# Patient Record
Sex: FEMALE | Race: WHITE
[De-identification: names, ages, dates, MRNs, and addresses within clinical notes are randomized per-mention and may not be internally consistent; named-entity substitution may affect disease eponyms.]

---

## 2018-08-28 PROBLEM — Z00.00 ENCOUNTER FOR PREVENTIVE HEALTH EXAMINATION: Status: ACTIVE | Noted: 2018-08-28

## 2018-08-29 ENCOUNTER — APPOINTMENT (OUTPATIENT)
Dept: GASTROENTEROLOGY | Facility: CLINIC | Age: 23
End: 2018-08-29
Payer: COMMERCIAL

## 2018-08-29 ENCOUNTER — LABORATORY RESULT (OUTPATIENT)
Age: 23
End: 2018-08-29

## 2018-08-29 VITALS
WEIGHT: 109 LBS | SYSTOLIC BLOOD PRESSURE: 100 MMHG | BODY MASS INDEX: 19.31 KG/M2 | HEART RATE: 84 BPM | TEMPERATURE: 98.2 F | HEIGHT: 63 IN | OXYGEN SATURATION: 98 % | RESPIRATION RATE: 14 BRPM | DIASTOLIC BLOOD PRESSURE: 68 MMHG

## 2018-08-29 PROCEDURE — 99204 OFFICE O/P NEW MOD 45 MIN: CPT | Mod: 25

## 2018-08-29 PROCEDURE — 36415 COLL VENOUS BLD VENIPUNCTURE: CPT

## 2018-08-29 RX ORDER — ETONOGESTREL AND ETHINYL ESTRADIOL .12; .015 MG/D; MG/D
0.12-0.015 INSERT, EXTENDED RELEASE VAGINAL
Qty: 1 | Refills: 0 | Status: ACTIVE | COMMUNITY
Start: 2018-08-19

## 2018-09-06 LAB
ANION GAP SERPL CALC-SCNC: 16 MMOL/L
BASOPHILS # BLD AUTO: 0.1 K/UL
BASOPHILS NFR BLD AUTO: 0.8 %
BUN SERPL-MCNC: 12 MG/DL
CALCIUM SERPL-MCNC: 9.7 MG/DL
CHLORIDE SERPL-SCNC: 102 MMOL/L
CO2 SERPL-SCNC: 23 MMOL/L
CREAT SERPL-MCNC: 0.7 MG/DL
CRP SERPL-MCNC: 0.72 MG/DL
EOSINOPHIL # BLD AUTO: 0.1 K/UL
EOSINOPHIL NFR BLD AUTO: 0.8 %
FERRITIN SERPL-MCNC: 108 NG/ML
GLUCOSE SERPL-MCNC: 101 MG/DL
HBV SURFACE AB SERPL IA-ACNC: <3 MIU/ML
HBV SURFACE AG SER QL: NONREACTIVE
HCT VFR BLD CALC: 37.1 %
HCV RNA FLD QL NAA+PROBE: NORMAL
HCV RNA SPEC QL PROBE+SIG AMP: NOT DETECTED
HGB BLD-MCNC: 12.4 G/DL
IRON SATN MFR SERPL: 56 %
IRON SERPL-MCNC: 152 UG/DL
LYMPHOCYTES # BLD AUTO: 3.33 K/UL
LYMPHOCYTES NFR BLD AUTO: 25.8 %
M TB IFN-G BLD-IMP: NEGATIVE
MAN DIFF?: NORMAL
MCHC RBC-ENTMCNC: 31.6 PG
MCHC RBC-ENTMCNC: 33.4 GM/DL
MCV RBC AUTO: 94.4 FL
MEV IGG FLD QL IA: 129 AU/ML
MEV IGG+IGM SER-IMP: POSITIVE
MONOCYTES # BLD AUTO: 0.79 K/UL
MONOCYTES NFR BLD AUTO: 6.1 %
MUV AB SER-ACNC: POSITIVE
MUV IGG SER QL IA: 57.7 AU/ML
NEUTROPHILS # BLD AUTO: 8.59 K/UL
NEUTROPHILS NFR BLD AUTO: 56.7 %
PLATELET # BLD AUTO: 395 K/UL
POTASSIUM SERPL-SCNC: 3.7 MMOL/L
QUANTIFERON TB PLUS MITOGEN MINUS NIL: >10 IU/ML
QUANTIFERON TB PLUS NIL: 0.03 IU/ML
QUANTIFERON TB PLUS TB1 MINUS NIL: 0 IU/ML
QUANTIFERON TB PLUS TB2 MINUS NIL: 0 IU/ML
RBC # BLD: 3.93 M/UL
RBC # FLD: 13.5 %
RUBV IGG FLD-ACNC: 1.4 INDEX
RUBV IGG SER-IMP: POSITIVE
SODIUM SERPL-SCNC: 141 MMOL/L
TIBC SERPL-MCNC: 270 UG/DL
UIBC SERPL-MCNC: 118 UG/DL
VZV AB TITR SER: POSITIVE
VZV IGG SER IF-ACNC: 409.1 INDEX
WBC # FLD AUTO: 12.92 K/UL

## 2018-10-11 ENCOUNTER — APPOINTMENT (OUTPATIENT)
Dept: GASTROENTEROLOGY | Facility: HOSPITAL | Age: 23
End: 2018-10-11

## 2018-10-11 ENCOUNTER — OUTPATIENT (OUTPATIENT)
Dept: OUTPATIENT SERVICES | Facility: HOSPITAL | Age: 23
LOS: 1 days | Discharge: HOME | End: 2018-10-11
Payer: COMMERCIAL

## 2018-10-11 ENCOUNTER — RESULT REVIEW (OUTPATIENT)
Age: 23
End: 2018-10-11

## 2018-10-11 PROCEDURE — 88305 TISSUE EXAM BY PATHOLOGIST: CPT

## 2018-10-11 PROCEDURE — 45380 COLONOSCOPY AND BIOPSY: CPT

## 2018-10-11 PROCEDURE — 45380 COLONOSCOPY AND BIOPSY: CPT | Mod: GC

## 2018-10-12 LAB — SURGICAL PATHOLOGY STUDY: SIGNIFICANT CHANGE UP

## 2018-10-25 ENCOUNTER — OTHER (OUTPATIENT)
Age: 23
End: 2018-10-25

## 2018-10-25 ENCOUNTER — RX RENEWAL (OUTPATIENT)
Age: 23
End: 2018-10-25

## 2018-10-25 DIAGNOSIS — A49.8 OTHER BACTERIAL INFECTIONS OF UNSPECIFIED SITE: ICD-10-CM

## 2018-10-26 ENCOUNTER — RX RENEWAL (OUTPATIENT)
Age: 23
End: 2018-10-26

## 2018-10-29 ENCOUNTER — RX RENEWAL (OUTPATIENT)
Age: 23
End: 2018-10-29

## 2018-10-31 ENCOUNTER — APPOINTMENT (OUTPATIENT)
Dept: GASTROENTEROLOGY | Facility: CLINIC | Age: 23
End: 2018-10-31
Payer: COMMERCIAL

## 2018-10-31 VITALS
TEMPERATURE: 99 F | HEART RATE: 86 BPM | OXYGEN SATURATION: 99 % | WEIGHT: 118 LBS | RESPIRATION RATE: 14 BRPM | BODY MASS INDEX: 20.91 KG/M2 | SYSTOLIC BLOOD PRESSURE: 110 MMHG | DIASTOLIC BLOOD PRESSURE: 80 MMHG | HEIGHT: 63 IN

## 2018-10-31 PROCEDURE — 36415 COLL VENOUS BLD VENIPUNCTURE: CPT | Mod: GC

## 2018-10-31 PROCEDURE — 99214 OFFICE O/P EST MOD 30 MIN: CPT | Mod: GC,25

## 2018-10-31 RX ORDER — METRONIDAZOLE 500 MG/1
500 TABLET ORAL
Qty: 21 | Refills: 0 | Status: DISCONTINUED | COMMUNITY
Start: 2018-08-05 | End: 2018-10-31

## 2018-11-01 LAB
IGA SER QL IEP: 160 MG/DL
TTG IGA SER IA-ACNC: <5 UNITS
TTG IGA SER-ACNC: NEGATIVE
TTG IGG SER IA-ACNC: <5 UNITS
TTG IGG SER IA-ACNC: NEGATIVE

## 2018-11-16 ENCOUNTER — OTHER (OUTPATIENT)
Age: 23
End: 2018-11-16

## 2018-11-19 ENCOUNTER — RX RENEWAL (OUTPATIENT)
Age: 23
End: 2018-11-19

## 2018-11-26 ENCOUNTER — RX RENEWAL (OUTPATIENT)
Age: 23
End: 2018-11-26

## 2018-11-26 RX ORDER — VANCOMYCIN HYDROCHLORIDE 125 MG/1
125 CAPSULE ORAL 4 TIMES DAILY
Qty: 70 | Refills: 0 | Status: DISCONTINUED | COMMUNITY
Start: 2018-10-26 | End: 2018-11-26

## 2018-11-26 RX ORDER — VANCOMYCIN HYDROCHLORIDE 125 MG/1
125 CAPSULE ORAL
Qty: 40 | Refills: 0 | Status: DISCONTINUED | COMMUNITY
Start: 2018-08-08 | End: 2018-11-26

## 2018-12-05 ENCOUNTER — MOBILE ON CALL (OUTPATIENT)
Age: 23
End: 2018-12-05

## 2018-12-13 ENCOUNTER — OTHER (OUTPATIENT)
Age: 23
End: 2018-12-13

## 2018-12-14 ENCOUNTER — RX RENEWAL (OUTPATIENT)
Age: 23
End: 2018-12-14

## 2018-12-20 ENCOUNTER — RX RENEWAL (OUTPATIENT)
Age: 23
End: 2018-12-20

## 2019-01-02 ENCOUNTER — APPOINTMENT (OUTPATIENT)
Dept: GASTROENTEROLOGY | Facility: CLINIC | Age: 24
End: 2019-01-02
Payer: COMMERCIAL

## 2019-01-02 VITALS
HEART RATE: 85 BPM | RESPIRATION RATE: 14 BRPM | TEMPERATURE: 97.7 F | DIASTOLIC BLOOD PRESSURE: 70 MMHG | OXYGEN SATURATION: 99 % | WEIGHT: 118 LBS | SYSTOLIC BLOOD PRESSURE: 120 MMHG

## 2019-01-02 PROCEDURE — 36415 COLL VENOUS BLD VENIPUNCTURE: CPT

## 2019-01-02 PROCEDURE — 99215 OFFICE O/P EST HI 40 MIN: CPT | Mod: 25

## 2019-01-02 RX ORDER — FIDAXOMICIN 200 MG/1
200 TABLET, FILM COATED ORAL
Qty: 20 | Refills: 0 | Status: DISCONTINUED | COMMUNITY
Start: 2018-10-25 | End: 2019-01-02

## 2019-01-02 RX ORDER — PREDNISONE 20 MG/1
20 TABLET ORAL
Qty: 32 | Refills: 0 | Status: DISCONTINUED | COMMUNITY
Start: 2018-08-15 | End: 2019-01-02

## 2019-01-02 RX ORDER — NITROGLYCERIN 4 MG/G
0.4 OINTMENT RECTAL
Qty: 1 | Refills: 3 | Status: DISCONTINUED | COMMUNITY
Start: 2018-12-04 | End: 2019-01-02

## 2019-01-02 RX ORDER — PREDNISONE 10 MG/1
10 TABLET ORAL DAILY
Qty: 40 | Refills: 0 | Status: DISCONTINUED | COMMUNITY
Start: 2018-08-29 | End: 2019-01-02

## 2019-01-02 RX ORDER — MESALAMINE 4 G/60ML
4 ENEMA RECTAL
Qty: 30 | Refills: 2 | Status: DISCONTINUED | COMMUNITY
Start: 2018-10-11 | End: 2019-01-02

## 2019-01-03 LAB
25(OH)D3 SERPL-MCNC: 41.9 NG/ML
ALBUMIN SERPL ELPH-MCNC: 4.3 G/DL
ALP BLD-CCNC: 32 U/L
ALT SERPL-CCNC: 15 U/L
ANION GAP SERPL CALC-SCNC: 12 MMOL/L
AST SERPL-CCNC: 19 U/L
BASOPHILS # BLD AUTO: 0.04 K/UL
BASOPHILS NFR BLD AUTO: 0.7 %
BILIRUB SERPL-MCNC: 0.3 MG/DL
BUN SERPL-MCNC: 11 MG/DL
CALCIUM SERPL-MCNC: 9.6 MG/DL
CHLORIDE SERPL-SCNC: 102 MMOL/L
CO2 SERPL-SCNC: 26 MMOL/L
CREAT SERPL-MCNC: 0.7 MG/DL
CRP SERPL-MCNC: 0.17 MG/DL
EOSINOPHIL # BLD AUTO: 0.05 K/UL
EOSINOPHIL NFR BLD AUTO: 0.9 %
GLUCOSE SERPL-MCNC: 83 MG/DL
HCT VFR BLD CALC: 38.5 %
HGB BLD-MCNC: 12.7 G/DL
IMM GRANULOCYTES NFR BLD AUTO: 0.2 %
IRON SATN MFR SERPL: 55 %
IRON SERPL-MCNC: 178 UG/DL
LYMPHOCYTES # BLD AUTO: 2.77 K/UL
LYMPHOCYTES NFR BLD AUTO: 49.1 %
MAN DIFF?: NORMAL
MCHC RBC-ENTMCNC: 30.4 PG
MCHC RBC-ENTMCNC: 33 GM/DL
MCV RBC AUTO: 92.1 FL
MONOCYTES # BLD AUTO: 0.38 K/UL
MONOCYTES NFR BLD AUTO: 6.7 %
NEUTROPHILS # BLD AUTO: 2.39 K/UL
NEUTROPHILS NFR BLD AUTO: 42.4 %
PLATELET # BLD AUTO: 274 K/UL
POTASSIUM SERPL-SCNC: 3.5 MMOL/L
PROT SERPL-MCNC: 7.4 G/DL
RBC # BLD: 4.18 M/UL
RBC # FLD: 12 %
SODIUM SERPL-SCNC: 140 MMOL/L
TIBC SERPL-MCNC: 322 UG/DL
UIBC SERPL-MCNC: 144 UG/DL
VIT B12 SERPL-MCNC: 372 PG/ML
WBC # FLD AUTO: 5.64 K/UL

## 2019-01-11 ENCOUNTER — RX RENEWAL (OUTPATIENT)
Age: 24
End: 2019-01-11

## 2019-01-11 DIAGNOSIS — K59.00 CONSTIPATION, UNSPECIFIED: ICD-10-CM

## 2019-01-25 ENCOUNTER — RX RENEWAL (OUTPATIENT)
Age: 24
End: 2019-01-25

## 2019-01-31 ENCOUNTER — APPOINTMENT (OUTPATIENT)
Dept: GASTROENTEROLOGY | Facility: CLINIC | Age: 24
End: 2019-01-31

## 2019-02-07 ENCOUNTER — RESULT REVIEW (OUTPATIENT)
Age: 24
End: 2019-02-07

## 2019-02-07 ENCOUNTER — APPOINTMENT (OUTPATIENT)
Dept: GASTROENTEROLOGY | Facility: HOSPITAL | Age: 24
End: 2019-02-07

## 2019-02-07 ENCOUNTER — OUTPATIENT (OUTPATIENT)
Dept: OUTPATIENT SERVICES | Facility: HOSPITAL | Age: 24
LOS: 1 days | Discharge: HOME | End: 2019-02-07
Payer: COMMERCIAL

## 2019-02-07 LAB
C DIFF GDH STL QL: NEGATIVE — SIGNIFICANT CHANGE UP
C DIFF GDH STL QL: SIGNIFICANT CHANGE UP

## 2019-02-07 PROCEDURE — 45380 COLONOSCOPY AND BIOPSY: CPT

## 2019-02-07 PROCEDURE — 87449 NOS EACH ORGANISM AG IA: CPT

## 2019-02-07 PROCEDURE — 87324 CLOSTRIDIUM AG IA: CPT

## 2019-02-07 PROCEDURE — 88305 TISSUE EXAM BY PATHOLOGIST: CPT

## 2019-02-08 LAB — SURGICAL PATHOLOGY STUDY: SIGNIFICANT CHANGE UP

## 2019-02-11 ENCOUNTER — APPOINTMENT (OUTPATIENT)
Dept: GASTROENTEROLOGY | Facility: CLINIC | Age: 24
End: 2019-02-11
Payer: COMMERCIAL

## 2019-02-11 VITALS
BODY MASS INDEX: 20.73 KG/M2 | HEART RATE: 89 BPM | OXYGEN SATURATION: 99 % | WEIGHT: 117 LBS | DIASTOLIC BLOOD PRESSURE: 60 MMHG | SYSTOLIC BLOOD PRESSURE: 100 MMHG | RESPIRATION RATE: 14 BRPM | HEIGHT: 63 IN

## 2019-02-11 DIAGNOSIS — K51.90 ULCERATIVE COLITIS, UNSPECIFIED, W/OUT COMPLICATIONS: ICD-10-CM

## 2019-02-11 PROCEDURE — 99214 OFFICE O/P EST MOD 30 MIN: CPT

## 2019-02-11 NOTE — ASSESSMENT
[FreeTextEntry1] : 24 y/o F with UC, diagnosed in 8/2018 (extent was proximal TC on initial colonoscopy), complicated by recurrent C diff infection x 3, s/p therapy with vancomycin x 2, fidaxomicin x 1, with resolution of infection (C diff PCR is now negative). No response on 5ASA, currently on pred 5 mg and has ongoing symptoms of diarrhea, hematochezia and urgency. \par \par # Entensive UC\par - start VDZ: apply for prior auth. Given safety profile of VDZ over IFX, will start with the former\par A detailed discussion of the risks and benefits of biologic therapy occurred. We detailed increased risk of infections (bacterial, fungal, viral) which we will mitigate by immunizing in appropriate fashion (HBV,zoster, flu, pneumovax); risk of malignancy with time spent on lymphoma (HSTCL in young adults) and skin cancers (need for derm annual eval) ; risk of liver injury, bone marrow suppression, CNS disorders, allergic and infusion (if IV admin) reactions, idiosyncratic skin reactions, joint problems, among other rare and unusual manifestations.  We discussed the need to notify if fevers or major illness prior to infusions, and the need to maintain follow up and obtain request labs and evaluations.  In addition, we have already discusses resources such as [facility] and the manufacturers "patients" page to obtain additional detailed information on the medication.\par - RTC after two loading doses\par - Canasa suppository in the interim for urgency\par - continue pred 5 mg qd and 5ASA PO for now\par - discussed expectations, therapy efficacy, home infusions and various treatment options at length and all questions were answered\par - Vit D and Ca while on steroids\par - Celiac serologies negative \par \par #HCM\par - flu shot with PCP\par - Can discuss dexa at next visit once off steroids \par \par RTC after two loading doses with VDZ\par \par Pt seen and d/w Dr Durham\par \par Violeta Perez MD, PGY7\par Advanced IBD Fellow\par

## 2019-02-11 NOTE — CONSULT LETTER
[Dear  ___] : Dear  [unfilled], [Courtesy Letter:] : I had the pleasure of seeing your patient, [unfilled], in my office today. [Please see my note below.] : Please see my note below. [Sincerely,] : Sincerely, [FreeTextEntry3] : Ming Durham MD\par Director, IBD Program\par [facility], [facility]\par \par Associate Professor of Medicine\par [employer]\par [employer] [facility]\par

## 2019-02-11 NOTE — HISTORY OF PRESENT ILLNESS
[de-identified] : 24 y/o F with UC, diagnosed in 8/2018 (extent was proximal TC on initial colonoscopy), complicated by recurrent C diff infection x 3, s/p therapy with vancomycin x 2, fidaxomicin x 1, with resolution of infection (C diff PCR is now negative). 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 CRP is now normal. \par She continues to have bloody diarrhea and severe urgency 10-20 times/claudy, night time awakening. No EIM. Restarted pred 5 mg qd, Apriso 4 pills daily. Unable to keep in Rowasa enema. \par \par No EIMs. \par \par Initial Hx:\par 23 F with new onset symptoms of bloody diarrhea, diagnosed with UC, sent by primary GI, Dr Martinez for further management. \par Pt says 6 weeks ago, she started having several episodes of bloody diarrhea a day with abd pain and significant weight loss. She went to her pediatrician who diagnosed her with C diff (test positive per pt) and started her on flayl with no improvement. She was referred to Dr Martinez, repeat C diff test (-) and stool studies (-). She underwent flex sig 8/15/18 with showed moderate active colitis from the anal verge to the distal TC (extent of exam) with biopsies consistent with chronic active colitis. She was started on vanco PO and prednisone 40 mg, has been on latter for 2 weeks now with significant improvement and feels 95% better. She now goes 2-5 times/day, scant blood, minimal pain. No fatigue, but unable to sleep d/t pred and had acid reflux. \par \par She has had a "sensitive stomach" for as long as she can remember- foods such as daily, oily or spicy foods give her diarrhea. \par \par Endoscopy: flex sig 8/15/18 with showed moderate active colitis from the anal verge to the distal TC (extent of exam) \par Pathology: chronic active colitis\par \par SH: , two young children, no smoking/ EtOH\par FH: no h/o IBD\par Allergies: NKDA

## 2019-02-11 NOTE — PHYSICAL EXAM
[General Appearance - Alert] : alert [General Appearance - In No Acute Distress] : in no acute distress [Sclera] : the sclera and conjunctiva were normal [Neck Appearance] : the appearance of the neck was normal [Abdomen Soft] : soft [Abdomen Tenderness] : non-tender [No CVA Tenderness] : no ~M costovertebral angle tenderness [Abnormal Walk] : normal gait [] : no rash [Oriented To Time, Place, And Person] : oriented to person, place, and time [Examination Of The Oral Cavity] : the lips and gums were normal

## 2019-02-19 ENCOUNTER — MOBILE ON CALL (OUTPATIENT)
Age: 24
End: 2019-02-19

## 2019-02-19 ENCOUNTER — INPATIENT (INPATIENT)
Facility: HOSPITAL | Age: 24
LOS: 1 days | Discharge: HOME | DRG: 386 | End: 2019-02-21
Payer: COMMERCIAL

## 2019-02-19 VITALS
SYSTOLIC BLOOD PRESSURE: 116 MMHG | OXYGEN SATURATION: 96 % | RESPIRATION RATE: 18 BRPM | TEMPERATURE: 98 F | WEIGHT: 116.62 LBS | HEART RATE: 81 BPM | DIASTOLIC BLOOD PRESSURE: 78 MMHG

## 2019-02-19 LAB
ALBUMIN SERPL ELPH-MCNC: 4.4 G/DL — SIGNIFICANT CHANGE UP (ref 3.3–5)
ALP SERPL-CCNC: 40 U/L — SIGNIFICANT CHANGE UP (ref 40–120)
ALT FLD-CCNC: 17 U/L — SIGNIFICANT CHANGE UP (ref 10–45)
ANION GAP SERPL CALC-SCNC: 14 MMOL/L — SIGNIFICANT CHANGE UP (ref 5–17)
AST SERPL-CCNC: 10 U/L — SIGNIFICANT CHANGE UP (ref 10–40)
BASOPHILS # BLD AUTO: 0.02 K/UL — SIGNIFICANT CHANGE UP (ref 0–0.2)
BASOPHILS NFR BLD AUTO: 0.2 % — SIGNIFICANT CHANGE UP (ref 0–2)
BILIRUB SERPL-MCNC: 0.2 MG/DL — SIGNIFICANT CHANGE UP (ref 0.2–1.2)
BUN SERPL-MCNC: 8 MG/DL — SIGNIFICANT CHANGE UP (ref 7–23)
CALCIUM SERPL-MCNC: 9.8 MG/DL — SIGNIFICANT CHANGE UP (ref 8.4–10.5)
CHLORIDE SERPL-SCNC: 99 MMOL/L — SIGNIFICANT CHANGE UP (ref 96–108)
CO2 SERPL-SCNC: 23 MMOL/L — SIGNIFICANT CHANGE UP (ref 22–31)
CREAT SERPL-MCNC: 0.87 MG/DL — SIGNIFICANT CHANGE UP (ref 0.5–1.3)
CRP SERPL-MCNC: 2.16 MG/DL — HIGH (ref 0–0.4)
EOSINOPHIL # BLD AUTO: 0.01 K/UL — SIGNIFICANT CHANGE UP (ref 0–0.5)
EOSINOPHIL NFR BLD AUTO: 0.1 % — SIGNIFICANT CHANGE UP (ref 0–6)
ERYTHROCYTE [SEDIMENTATION RATE] IN BLOOD: 4 MM/HR — SIGNIFICANT CHANGE UP
GLUCOSE SERPL-MCNC: 111 MG/DL — HIGH (ref 70–99)
HCG SERPL-ACNC: <.1 MIU/ML — SIGNIFICANT CHANGE UP
HCT VFR BLD CALC: 39.6 % — SIGNIFICANT CHANGE UP (ref 34.5–45)
HGB BLD-MCNC: 13.7 G/DL — SIGNIFICANT CHANGE UP (ref 11.5–15.5)
IMM GRANULOCYTES NFR BLD AUTO: 0.6 % — SIGNIFICANT CHANGE UP (ref 0–1.5)
LACTATE SERPL-SCNC: 1 MMOL/L — SIGNIFICANT CHANGE UP (ref 0.5–2)
LIDOCAIN IGE QN: 15 U/L — SIGNIFICANT CHANGE UP (ref 7–60)
LYMPHOCYTES # BLD AUTO: 1.69 K/UL — SIGNIFICANT CHANGE UP (ref 1–3.3)
LYMPHOCYTES # BLD AUTO: 17.6 % — SIGNIFICANT CHANGE UP (ref 13–44)
MAGNESIUM SERPL-MCNC: 2.2 MG/DL — SIGNIFICANT CHANGE UP (ref 1.6–2.6)
MCHC RBC-ENTMCNC: 31.7 PG — SIGNIFICANT CHANGE UP (ref 27–34)
MCHC RBC-ENTMCNC: 34.6 GM/DL — SIGNIFICANT CHANGE UP (ref 32–36)
MCV RBC AUTO: 91.7 FL — SIGNIFICANT CHANGE UP (ref 80–100)
MONOCYTES # BLD AUTO: 0.78 K/UL — SIGNIFICANT CHANGE UP (ref 0–0.9)
MONOCYTES NFR BLD AUTO: 8.1 % — SIGNIFICANT CHANGE UP (ref 2–14)
NEUTROPHILS # BLD AUTO: 7.02 K/UL — SIGNIFICANT CHANGE UP (ref 1.8–7.4)
NEUTROPHILS NFR BLD AUTO: 73.4 % — SIGNIFICANT CHANGE UP (ref 43–77)
NRBC # BLD: 0 /100 WBCS — SIGNIFICANT CHANGE UP (ref 0–0)
PLATELET # BLD AUTO: 354 K/UL — SIGNIFICANT CHANGE UP (ref 150–400)
POTASSIUM SERPL-MCNC: 3.8 MMOL/L — SIGNIFICANT CHANGE UP (ref 3.5–5.3)
POTASSIUM SERPL-SCNC: 3.8 MMOL/L — SIGNIFICANT CHANGE UP (ref 3.5–5.3)
PROT SERPL-MCNC: 7.5 G/DL — SIGNIFICANT CHANGE UP (ref 6–8.3)
RBC # BLD: 4.32 M/UL — SIGNIFICANT CHANGE UP (ref 3.8–5.2)
RBC # FLD: 11.2 % — SIGNIFICANT CHANGE UP (ref 10.3–14.5)
SODIUM SERPL-SCNC: 136 MMOL/L — SIGNIFICANT CHANGE UP (ref 135–145)
WBC # BLD: 9.58 K/UL — SIGNIFICANT CHANGE UP (ref 3.8–10.5)
WBC # FLD AUTO: 9.58 K/UL — SIGNIFICANT CHANGE UP (ref 3.8–10.5)

## 2019-02-19 PROCEDURE — 99285 EMERGENCY DEPT VISIT HI MDM: CPT

## 2019-02-19 RX ORDER — ONDANSETRON 8 MG/1
4 TABLET, FILM COATED ORAL ONCE
Qty: 0 | Refills: 0 | Status: COMPLETED | OUTPATIENT
Start: 2019-02-19 | End: 2019-02-19

## 2019-02-19 RX ORDER — HEPARIN SODIUM 5000 [USP'U]/ML
5000 INJECTION INTRAVENOUS; SUBCUTANEOUS EVERY 8 HOURS
Qty: 0 | Refills: 0 | Status: DISCONTINUED | OUTPATIENT
Start: 2019-02-19 | End: 2019-02-20

## 2019-02-19 RX ORDER — MORPHINE SULFATE 50 MG/1
1 CAPSULE, EXTENDED RELEASE ORAL ONCE
Qty: 0 | Refills: 0 | Status: DISCONTINUED | OUTPATIENT
Start: 2019-02-19 | End: 2019-02-19

## 2019-02-19 RX ORDER — HYDROMORPHONE HYDROCHLORIDE 2 MG/ML
0.5 INJECTION INTRAMUSCULAR; INTRAVENOUS; SUBCUTANEOUS ONCE
Qty: 0 | Refills: 0 | Status: DISCONTINUED | OUTPATIENT
Start: 2019-02-19 | End: 2019-02-19

## 2019-02-19 RX ORDER — SODIUM CHLORIDE 9 MG/ML
1000 INJECTION INTRAMUSCULAR; INTRAVENOUS; SUBCUTANEOUS ONCE
Qty: 0 | Refills: 0 | Status: COMPLETED | OUTPATIENT
Start: 2019-02-19 | End: 2019-02-19

## 2019-02-19 RX ORDER — SODIUM CHLORIDE 9 MG/ML
1000 INJECTION INTRAMUSCULAR; INTRAVENOUS; SUBCUTANEOUS
Qty: 0 | Refills: 0 | Status: DISCONTINUED | OUTPATIENT
Start: 2019-02-19 | End: 2019-02-21

## 2019-02-19 RX ORDER — FAMOTIDINE 10 MG/ML
20 INJECTION INTRAVENOUS ONCE
Qty: 0 | Refills: 0 | Status: COMPLETED | OUTPATIENT
Start: 2019-02-19 | End: 2019-02-19

## 2019-02-19 RX ADMIN — SODIUM CHLORIDE 1000 MILLILITER(S): 9 INJECTION INTRAMUSCULAR; INTRAVENOUS; SUBCUTANEOUS at 21:30

## 2019-02-19 NOTE — ED ADULT NURSE REASSESSMENT NOTE - NS ED NURSE REASSESS COMMENT FT1
Patient care and endorsement received from previous RN.  Patient 22y/o c/o rectal bleed, Dx. w/ ulcerative colitis w/ rectal bleeding. Vital signs stable.  #20 PIV in Right AC.  NKDA.  As per previous RN, patient refusing all meds ordered including Zofran and pain meds, states does not need it;  GI MD and Dr. Alexis made aware.  Received NSS 1 L bolus.  Patient NPO.  For admit.  Stable and comfortable.

## 2019-02-19 NOTE — ED ADULT NURSE NOTE - NSIMPLEMENTINTERV_GEN_ALL_ED
Implemented All Universal Safety Interventions:  [place] to call system. Call bell, personal items and telephone within reach. Instruct patient to call for assistance. Room bathroom lighting operational. Non-slip footwear when patient is off stretcher. Physically safe environment: no spills, clutter or unnecessary equipment. Stretcher in lowest position, wheels locked, appropriate side rails in place.

## 2019-02-19 NOTE — ED PROVIDER NOTE - CLINICAL SUMMARY MEDICAL DECISION MAKING FREE TEXT BOX
+ UC flare unresponsive to home meds, soft ntnd abdomen and HDS in ED, will admit to [facility] to be seen/evaluated by GI and started on further UC treatment pending c.diff screening. GI aware and following. Requesting 20mg solumedrol if pt's c.diff screening is negative.

## 2019-02-19 NOTE — ED PROVIDER NOTE - OBJECTIVE STATEMENT
22 y/o F w/hx ulcerative colitis, previously had c.diff several months ago, last tested for c.diff 2wks ago which was negative, recently tapered off of steroids and re-developed UC sx, including crampy total abd pain and bright red bloody diarrhea, increasing in frequency over past week, several episodes today, last episode 1hr prior to arrival despite resumption of PO steroids, took 40mg prednisone today. No fever/chills. No vomiting. No urinary sx. D/w GI who sent pt to ED for evaluation, treatment.

## 2019-02-19 NOTE — ED ADULT TRIAGE NOTE - ARRIVAL INFO ADDITIONAL COMMENTS
pt c/o abd pain for 1 week which is similar to her ulcerative colitis which was dx 8 months ago.  no n/v but today has rectal bleeding.

## 2019-02-19 NOTE — ED PROVIDER NOTE - PHYSICAL EXAMINATION
VITAL SIGNS: I have reviewed nursing notes and confirm.  CONSTITUTIONAL: Well-developed; well-nourished; in no acute distress.  SKIN: Agree with RN documentation regarding decubitus evaluation. Remainder of skin exam is warm and dry, no acute rash.  HEAD: Normocephalic; atraumatic.  EYES: PERRL, EOM intact; conjunctiva and sclera clear, + subconjunctival pallor  ENT: No nasal discharge; airway clear.  NECK: Supple; non tender.  CARD: S1, S2 normal; no murmurs, gallops, or rubs. RRR  RESP: No wheezes, rales or rhonchi. CTA w/good excursion, no inc wob  ABD: Normal bowel sounds; soft; non-distended; non-tender  EXT: Normal ROM. No clubbing, cyanosis or edema.  LYMPH: No acute cervical adenopathy.  NEURO: Alert, oriented. Grossly unremarkable.  PSYCH: Cooperative, appropriate.

## 2019-02-20 ENCOUNTER — APPOINTMENT (OUTPATIENT)
Dept: GASTROENTEROLOGY | Facility: CLINIC | Age: 24
End: 2019-02-20

## 2019-02-20 DIAGNOSIS — D64.9 ANEMIA, UNSPECIFIED: ICD-10-CM

## 2019-02-20 DIAGNOSIS — K51.911 ULCERATIVE COLITIS, UNSPECIFIED WITH RECTAL BLEEDING: ICD-10-CM

## 2019-02-20 DIAGNOSIS — K46.9 UNSPECIFIED ABDOMINAL HERNIA WITHOUT OBSTRUCTION OR GANGRENE: Chronic | ICD-10-CM

## 2019-02-20 DIAGNOSIS — R63.8 OTHER SYMPTOMS AND SIGNS CONCERNING FOOD AND FLUID INTAKE: ICD-10-CM

## 2019-02-20 DIAGNOSIS — Z91.89 OTHER SPECIFIED PERSONAL RISK FACTORS, NOT ELSEWHERE CLASSIFIED: ICD-10-CM

## 2019-02-20 DIAGNOSIS — Z29.9 ENCOUNTER FOR PROPHYLACTIC MEASURES, UNSPECIFIED: ICD-10-CM

## 2019-02-20 LAB
ANION GAP SERPL CALC-SCNC: 11 MMOL/L — SIGNIFICANT CHANGE UP (ref 5–17)
APPEARANCE UR: CLEAR — SIGNIFICANT CHANGE UP
BILIRUB UR-MCNC: NEGATIVE — SIGNIFICANT CHANGE UP
BLD GP AB SCN SERPL QL: NEGATIVE — SIGNIFICANT CHANGE UP
BLD GP AB SCN SERPL QL: NEGATIVE — SIGNIFICANT CHANGE UP
BUN SERPL-MCNC: 6 MG/DL — LOW (ref 7–23)
C DIFF GDH STL QL: NEGATIVE — SIGNIFICANT CHANGE UP
C DIFF GDH STL QL: SIGNIFICANT CHANGE UP
CALCIUM SERPL-MCNC: 8.4 MG/DL — SIGNIFICANT CHANGE UP (ref 8.4–10.5)
CHLORIDE SERPL-SCNC: 105 MMOL/L — SIGNIFICANT CHANGE UP (ref 96–108)
CO2 SERPL-SCNC: 21 MMOL/L — LOW (ref 22–31)
COLOR SPEC: YELLOW — SIGNIFICANT CHANGE UP
CREAT SERPL-MCNC: 0.79 MG/DL — SIGNIFICANT CHANGE UP (ref 0.5–1.3)
DIFF PNL FLD: NEGATIVE — SIGNIFICANT CHANGE UP
EXTRA GREEN TOP TUBE: SIGNIFICANT CHANGE UP
EXTRA SST TUBE: SIGNIFICANT CHANGE UP
GLUCOSE SERPL-MCNC: 79 MG/DL — SIGNIFICANT CHANGE UP (ref 70–99)
GLUCOSE UR QL: NEGATIVE — SIGNIFICANT CHANGE UP
HBV CORE AB SER-ACNC: SIGNIFICANT CHANGE UP
HBV CORE IGM SER-ACNC: SIGNIFICANT CHANGE UP
HBV SURFACE AB SER-ACNC: SIGNIFICANT CHANGE UP
HBV SURFACE AG SER-ACNC: SIGNIFICANT CHANGE UP
HCT VFR BLD CALC: 32.8 % — LOW (ref 34.5–45)
HCT VFR BLD CALC: 35.2 % — SIGNIFICANT CHANGE UP (ref 34.5–45)
HGB BLD-MCNC: 10.8 G/DL — LOW (ref 11.5–15.5)
HGB BLD-MCNC: 11.7 G/DL — SIGNIFICANT CHANGE UP (ref 11.5–15.5)
KETONES UR-MCNC: 15 MG/DL
LEUKOCYTE ESTERASE UR-ACNC: NEGATIVE — SIGNIFICANT CHANGE UP
MAGNESIUM SERPL-MCNC: 1.8 MG/DL — SIGNIFICANT CHANGE UP (ref 1.6–2.6)
MCHC RBC-ENTMCNC: 30.5 PG — SIGNIFICANT CHANGE UP (ref 27–34)
MCHC RBC-ENTMCNC: 30.5 PG — SIGNIFICANT CHANGE UP (ref 27–34)
MCHC RBC-ENTMCNC: 32.9 GM/DL — SIGNIFICANT CHANGE UP (ref 32–36)
MCHC RBC-ENTMCNC: 33.2 GM/DL — SIGNIFICANT CHANGE UP (ref 32–36)
MCV RBC AUTO: 91.7 FL — SIGNIFICANT CHANGE UP (ref 80–100)
MCV RBC AUTO: 92.7 FL — SIGNIFICANT CHANGE UP (ref 80–100)
NITRITE UR-MCNC: NEGATIVE — SIGNIFICANT CHANGE UP
NRBC # BLD: 0 /100 WBCS — SIGNIFICANT CHANGE UP (ref 0–0)
NRBC # BLD: 0 /100 WBCS — SIGNIFICANT CHANGE UP (ref 0–0)
PH UR: 6 — SIGNIFICANT CHANGE UP (ref 5–8)
PHOSPHATE SERPL-MCNC: 3 MG/DL — SIGNIFICANT CHANGE UP (ref 2.5–4.5)
PLATELET # BLD AUTO: 278 K/UL — SIGNIFICANT CHANGE UP (ref 150–400)
PLATELET # BLD AUTO: 290 K/UL — SIGNIFICANT CHANGE UP (ref 150–400)
POTASSIUM SERPL-MCNC: 3.5 MMOL/L — SIGNIFICANT CHANGE UP (ref 3.5–5.3)
POTASSIUM SERPL-SCNC: 3.5 MMOL/L — SIGNIFICANT CHANGE UP (ref 3.5–5.3)
PROT UR-MCNC: NEGATIVE MG/DL — SIGNIFICANT CHANGE UP
RBC # BLD: 3.54 M/UL — LOW (ref 3.8–5.2)
RBC # BLD: 3.84 M/UL — SIGNIFICANT CHANGE UP (ref 3.8–5.2)
RBC # FLD: 11.4 % — SIGNIFICANT CHANGE UP (ref 10.3–14.5)
RBC # FLD: 11.5 % — SIGNIFICANT CHANGE UP (ref 10.3–14.5)
RH IG SCN BLD-IMP: POSITIVE — SIGNIFICANT CHANGE UP
RH IG SCN BLD-IMP: POSITIVE — SIGNIFICANT CHANGE UP
SODIUM SERPL-SCNC: 137 MMOL/L — SIGNIFICANT CHANGE UP (ref 135–145)
SP GR SPEC: 1.02 — SIGNIFICANT CHANGE UP (ref 1–1.03)
UROBILINOGEN FLD QL: 0.2 E.U./DL — SIGNIFICANT CHANGE UP
WBC # BLD: 12.69 K/UL — HIGH (ref 3.8–10.5)
WBC # BLD: 9.7 K/UL — SIGNIFICANT CHANGE UP (ref 3.8–10.5)
WBC # FLD AUTO: 12.69 K/UL — HIGH (ref 3.8–10.5)
WBC # FLD AUTO: 9.7 K/UL — SIGNIFICANT CHANGE UP (ref 3.8–10.5)

## 2019-02-20 PROCEDURE — 99233 SBSQ HOSP IP/OBS HIGH 50: CPT | Mod: GC

## 2019-02-20 PROCEDURE — 99223 1ST HOSP IP/OBS HIGH 75: CPT | Mod: GC

## 2019-02-20 RX ORDER — HYDROCORTISONE 1 %
1 OINTMENT (GRAM) TOPICAL THREE TIMES A DAY
Qty: 0 | Refills: 0 | Status: DISCONTINUED | OUTPATIENT
Start: 2019-02-20 | End: 2019-02-21

## 2019-02-20 RX ORDER — ACETAMINOPHEN 500 MG
650 TABLET ORAL ONCE
Qty: 0 | Refills: 0 | Status: COMPLETED | OUTPATIENT
Start: 2019-02-20 | End: 2019-02-20

## 2019-02-20 RX ORDER — MESALAMINE 400 MG
800 TABLET, DELAYED RELEASE (ENTERIC COATED) ORAL
Qty: 0 | Refills: 0 | Status: DISCONTINUED | OUTPATIENT
Start: 2019-02-20 | End: 2019-02-21

## 2019-02-20 RX ORDER — HEPARIN SODIUM 5000 [USP'U]/ML
5000 INJECTION INTRAVENOUS; SUBCUTANEOUS EVERY 8 HOURS
Qty: 0 | Refills: 0 | Status: DISCONTINUED | OUTPATIENT
Start: 2019-02-20 | End: 2019-02-21

## 2019-02-20 RX ORDER — POTASSIUM CHLORIDE 20 MEQ
40 PACKET (EA) ORAL ONCE
Qty: 0 | Refills: 0 | Status: COMPLETED | OUTPATIENT
Start: 2019-02-20 | End: 2019-02-20

## 2019-02-20 RX ORDER — POTASSIUM CHLORIDE 20 MEQ
20 PACKET (EA) ORAL
Qty: 0 | Refills: 0 | Status: DISCONTINUED | OUTPATIENT
Start: 2019-02-20 | End: 2019-02-20

## 2019-02-20 RX ORDER — ACETAMINOPHEN 500 MG
1000 TABLET ORAL ONCE
Qty: 0 | Refills: 0 | Status: DISCONTINUED | OUTPATIENT
Start: 2019-02-20 | End: 2019-02-21

## 2019-02-20 RX ORDER — MESALAMINE 400 MG
1600 TABLET, DELAYED RELEASE (ENTERIC COATED) ORAL DAILY
Qty: 0 | Refills: 0 | Status: DISCONTINUED | OUTPATIENT
Start: 2019-02-20 | End: 2019-02-20

## 2019-02-20 RX ORDER — MAGNESIUM SULFATE 500 MG/ML
1 VIAL (ML) INJECTION ONCE
Qty: 0 | Refills: 0 | Status: COMPLETED | OUTPATIENT
Start: 2019-02-20 | End: 2019-02-20

## 2019-02-20 RX ORDER — INFLIXIMAB-DYYB 120 MG/ML
300 INJECTION SUBCUTANEOUS ONCE
Qty: 0 | Refills: 0 | Status: COMPLETED | OUTPATIENT
Start: 2019-02-20 | End: 2019-02-20

## 2019-02-20 RX ORDER — OXYCODONE AND ACETAMINOPHEN 5; 325 MG/1; MG/1
1 TABLET ORAL ONCE
Qty: 0 | Refills: 0 | Status: DISCONTINUED | OUTPATIENT
Start: 2019-02-20 | End: 2019-02-20

## 2019-02-20 RX ORDER — ACETAMINOPHEN 500 MG
650 TABLET ORAL EVERY 6 HOURS
Qty: 0 | Refills: 0 | Status: DISCONTINUED | OUTPATIENT
Start: 2019-02-20 | End: 2019-02-21

## 2019-02-20 RX ORDER — DIPHENHYDRAMINE HCL 50 MG
25 CAPSULE ORAL ONCE
Qty: 0 | Refills: 0 | Status: COMPLETED | OUTPATIENT
Start: 2019-02-20 | End: 2019-02-20

## 2019-02-20 RX ORDER — ACETAMINOPHEN 500 MG
1000 TABLET ORAL ONCE
Qty: 0 | Refills: 0 | Status: COMPLETED | OUTPATIENT
Start: 2019-02-20 | End: 2019-02-20

## 2019-02-20 RX ADMIN — Medication 1 APPLICATION(S): at 22:00

## 2019-02-20 RX ADMIN — Medication 1000 MILLIGRAM(S): at 01:03

## 2019-02-20 RX ADMIN — Medication 400 MILLIGRAM(S): at 00:27

## 2019-02-20 RX ADMIN — Medication 25 MILLIGRAM(S): at 14:30

## 2019-02-20 RX ADMIN — HEPARIN SODIUM 5000 UNIT(S): 5000 INJECTION INTRAVENOUS; SUBCUTANEOUS at 21:08

## 2019-02-20 RX ADMIN — OXYCODONE AND ACETAMINOPHEN 1 TABLET(S): 5; 325 TABLET ORAL at 21:09

## 2019-02-20 RX ADMIN — OXYCODONE AND ACETAMINOPHEN 1 TABLET(S): 5; 325 TABLET ORAL at 22:09

## 2019-02-20 RX ADMIN — Medication 1 TABLET(S): at 12:40

## 2019-02-20 RX ADMIN — Medication 1 APPLICATION(S): at 07:02

## 2019-02-20 RX ADMIN — Medication 800 MILLIGRAM(S): at 21:59

## 2019-02-20 RX ADMIN — Medication 40 MILLIEQUIVALENT(S): at 09:24

## 2019-02-20 RX ADMIN — HEPARIN SODIUM 5000 UNIT(S): 5000 INJECTION INTRAVENOUS; SUBCUTANEOUS at 14:31

## 2019-02-20 RX ADMIN — OXYCODONE AND ACETAMINOPHEN 1 TABLET(S): 5; 325 TABLET ORAL at 09:25

## 2019-02-20 RX ADMIN — Medication 650 MILLIGRAM(S): at 14:31

## 2019-02-20 RX ADMIN — Medication 1 APPLICATION(S): at 14:31

## 2019-02-20 RX ADMIN — Medication 20 MILLIGRAM(S): at 21:08

## 2019-02-20 RX ADMIN — Medication 20 MILLIGRAM(S): at 11:15

## 2019-02-20 RX ADMIN — Medication 800 MILLIGRAM(S): at 11:06

## 2019-02-20 RX ADMIN — Medication 100 GRAM(S): at 09:24

## 2019-02-20 RX ADMIN — SODIUM CHLORIDE 125 MILLILITER(S): 9 INJECTION INTRAMUSCULAR; INTRAVENOUS; SUBCUTANEOUS at 00:27

## 2019-02-20 RX ADMIN — INFLIXIMAB-DYYB 140 MILLIGRAM(S): 120 INJECTION SUBCUTANEOUS at 14:58

## 2019-02-20 RX ADMIN — Medication 650 MILLIGRAM(S): at 14:50

## 2019-02-20 NOTE — PROGRESS NOTE ADULT - ASSESSMENT
23F with hx c diff and UC presenting with 1 week of abd cramping and bloody diarrhea, likely UC flare.

## 2019-02-20 NOTE — H&P ADULT - ATTENDING COMMENTS
Patient seen and examined with house-staff during bedside rounds.  Resident note read, including vitals, physical findings, laboratory data, and radiological reports.   Revisions included below.  Direct personal management at bed side and extensive interpretation of the data.  Plan was outlined and discussed in details with the housestaff.  Decision making of high complexity  Action taken for acute disease activity to reflect the level of care provided:  - medication reconciliation  - review laboratory data  C diff is negative and she is on steroids and S/P Infliximab

## 2019-02-20 NOTE — CONSULT NOTE ADULT - SUBJECTIVE AND OBJECTIVE BOX
HPI:  Pt is a 23F with a history of U/C and recurrent c. diff (x3) who is presenting with abdominal pain and diarrhea. Pt reports that for the past few weeks, she has been having up to 20 episodes of bloody diarrhea each day, associated with shapt abdominal cramps. She last saw Dr. Durham in clinic on Feb 11 with plans for biologic therapy, currently awaiting insurance approval. Since then her symptoms have worsened. She was started on prednisone 40 mg po daily yesterday, with no relief, prompting her to come to the ER. She denies fevers, chills, joint pains, rashes, sick contacts,         Allergies    No Known Allergies    Intolerances      Home Medications:  Apriso 0.375 g oral capsule, extended release: 4 cap(s) orally once a day (in the morning) (20 Feb 2019 08:10)  Calcium 600+D oral tablet: 1 tab(s) orally 2 times a day (20 Feb 2019 08:10)  lidocaine topical:  (20 Feb 2019 08:10)  Low-Ogestrel 0.3 mg-30 mcg oral tablet: 1 tab(s) orally once a day (20 Feb 2019 08:10)  MiraLax oral powder for reconstitution: 17 gram(s) orally 3 times a day (20 Feb 2019 08:10)  NuvaRing 0.120 mg-0.015 mg/24 hours vaginal ring:  (20 Feb 2019 08:10)  predniSONE 20 mg oral tablet: 2 tab(s) orally once a day (20 Feb 2019 08:10)  Proctosol-HC 2.5% rectal cream with applicator: 1 application rectal 3 times a day (20 Feb 2019 08:10)    MEDICATIONS:  MEDICATIONS  (STANDING):  acetaminophen  IVPB .. 1000 milliGRAM(s) IV Intermittent once  calcium carbonate 1250 mG  + Vitamin D (OsCal 500 + D) 1 Tablet(s) Oral daily  heparin  Injectable 5000 Unit(s) SubCutaneous every 8 hours  hydrocortisone 2.5% Rectal Cream 1 Application(s) Rectal three times a day  mesalamine DR Capsule 800 milliGRAM(s) Oral two times a day  sodium chloride 0.9%. 1000 milliLiter(s) (125 mL/Hr) IV Continuous <Continuous>    MEDICATIONS  (PRN):  acetaminophen   Tablet .. 650 milliGRAM(s) Oral every 6 hours PRN Moderate Pain (4 - 6)    PAST MEDICAL & SURGICAL HISTORY:  Clostridial gastroenteritis  Ulcerative colitis with complication, unspecified location  Acute hernia    FAMILY HISTORY:  No pertinent family history    SOCIAL HISTORY:  Tobacoo: denies  Alcohol: denies  Illicit Drugs: denies    REVIEW OF SYSTEMS:  CONSTITUTIONAL: No weakness, fevers or chills  HEENT: No visual changes; No vertigo or throat pain   NECK: No pain or stiffness  RESPIRATORY: No cough, wheezing, hemoptysis; No shortness of breath  CARDIOVASCULAR: No chest pain or palpitations  GASTROINTESTINAL: + abd cramps, bloody diarrhea  GENITOURINARY: No dysuria, frequency or hematuria  NEUROLOGICAL: No numbness or weakness  SKIN: No itching, burning, rashes, or lesions   All other 10 review of systems is negative unless indicated above.    Vital Signs Last 24 Hrs  T(C): 36.7 (20 Feb 2019 04:29), Max: 36.7 (20 Feb 2019 04:29)  T(F): 98 (20 Feb 2019 04:29), Max: 98 (20 Feb 2019 04:29)  HR: 69 (20 Feb 2019 04:29) (69 - 81)  BP: 106/70 (20 Feb 2019 04:29) (106/70 - 116/78)  BP(mean): --  RR: 16 (20 Feb 2019 04:29) (16 - 18)  SpO2: 98% (20 Feb 2019 04:29) (96% - 98%)      PHYSICAL EXAM:    General: Well developed; well nourished; in no acute distress  Eyes: Anicteric sclerae, moist conjunctivae  HENT: Moist mucous membranes  Neck: Trachea midline, supple  Lungs: Normal respiratory effors and no intercostal retractions  Cardiovascular: RRR  Abdomen: Soft, non-tender non-distended; Normal bowel sounds; No rebound or guarding  Extremities: Normal range of motion, No clubbing, cyanosis or edema  Neurological: Alert and oriented x3  Skin: Warm and dry. No obvious rash    LABS:                        10.8   9.70  )-----------( 278      ( 20 Feb 2019 05:37 )             32.8     02-20    137  |  105  |  6<L>  ----------------------------<  79  3.5   |  21<L>  |  0.79    Ca    8.4      20 Feb 2019 05:36  Phos  3.0     02-20  Mg     1.8     02-20    TPro  7.5  /  Alb  4.4  /  TBili  0.2  /  DBili  x   /  AST  10  /  ALT  17  /  AlkPhos  40  02-19            RADIOLOGY & ADDITIONAL STUDIES: HPI:  Pt is a 23F with a history of U/C and recurrent c. diff (x3) who is presenting with abdominal pain and diarrhea. Pt reports that for the past few weeks, she has been having up to 20 episodes of bloody diarrhea each day, associated with shapt abdominal cramps. She last saw Dr. Durham in clinic on Feb 11 with plans for biologic therapy, currently awaiting insurance approval. Since then her symptoms have worsened. She was started on prednisone 40 mg po daily yesterday, with no relief, prompting her to come to the ER. She denies fevers, chills, joint pains, rashes, sick contacts, recent travel.     She underwent colonoscopy 2/7/19, had Rosales 2 proctosigmoiditis up to 35 cm, with normal proximal colon and TI.        Allergies    No Known Allergies    Intolerances      Home Medications:  Apriso 0.375 g oral capsule, extended release: 4 cap(s) orally once a day (in the morning) (20 Feb 2019 08:10)  Calcium 600+D oral tablet: 1 tab(s) orally 2 times a day (20 Feb 2019 08:10)  lidocaine topical:  (20 Feb 2019 08:10)  Low-Ogestrel 0.3 mg-30 mcg oral tablet: 1 tab(s) orally once a day (20 Feb 2019 08:10)  MiraLax oral powder for reconstitution: 17 gram(s) orally 3 times a day (20 Feb 2019 08:10)  NuvaRing 0.120 mg-0.015 mg/24 hours vaginal ring:  (20 Feb 2019 08:10)  predniSONE 20 mg oral tablet: 2 tab(s) orally once a day (20 Feb 2019 08:10)  Proctosol-HC 2.5% rectal cream with applicator: 1 application rectal 3 times a day (20 Feb 2019 08:10)    MEDICATIONS:  MEDICATIONS  (STANDING):  acetaminophen  IVPB .. 1000 milliGRAM(s) IV Intermittent once  calcium carbonate 1250 mG  + Vitamin D (OsCal 500 + D) 1 Tablet(s) Oral daily  heparin  Injectable 5000 Unit(s) SubCutaneous every 8 hours  hydrocortisone 2.5% Rectal Cream 1 Application(s) Rectal three times a day  mesalamine DR Capsule 800 milliGRAM(s) Oral two times a day  sodium chloride 0.9%. 1000 milliLiter(s) (125 mL/Hr) IV Continuous <Continuous>    MEDICATIONS  (PRN):  acetaminophen   Tablet .. 650 milliGRAM(s) Oral every 6 hours PRN Moderate Pain (4 - 6)    PAST MEDICAL & SURGICAL HISTORY:  Clostridial gastroenteritis  Ulcerative colitis with complication, unspecified location  Acute hernia    FAMILY HISTORY:  No pertinent family history    SOCIAL HISTORY:  T[place]ac[place]: denies  Alcohol: denies  Illicit Drugs: denies    REVIEW OF SYSTEMS:  CONSTITUTIONAL: No weakness, fevers or chills  HEENT: No visual changes; No vertigo or throat pain   NECK: No pain or stiffness  RESPIRATORY: No cough, wheezing, hemoptysis; No shortness of breath  CARDIOVASCULAR: No chest pain or palpitations  GASTROINTESTINAL: + abd cramps, bloody diarrhea  GENITOURINARY: No dysuria, frequency or hematuria  NEUROLOGICAL: No numbness or weakness  SKIN: No itching, burning, rashes, or lesions   All other 10 review of systems is negative unless indicated above.    Vital Signs Last 24 Hrs  T(C): 36.7 (20 Feb 2019 04:29), Max: 36.7 (20 Feb 2019 04:29)  T(F): 98 (20 Feb 2019 04:29), Max: 98 (20 Feb 2019 04:29)  HR: 69 (20 Feb 2019 04:29) (69 - 81)  BP: 106/70 (20 Feb 2019 04:29) (106/70 - 116/78)  BP(mean): --  RR: 16 (20 Feb 2019 04:29) (16 - 18)  SpO2: 98% (20 Feb 2019 04:29) (96% - 98%)      PHYSICAL EXAM:    General: Well developed; well nourished; in no acute distress  Eyes: Anicteric sclerae, moist conjunctivae  HENT: Moist mucous membranes  Neck: Trachea midline, supple  Lungs: Normal respiratory effors and no intercostal retractions  Cardiovascular: RRR  Abdomen: Soft, non-tender non-distended; Normal bowel sounds; No rebound or guarding  Extremities: Normal range of motion, No clubbing, cyanosis or edema  Neurological: Alert and oriented x3  Skin: Warm and dry. No obvious rash    LABS:                        10.8   9.70  )-----------( 278      ( 20 Feb 2019 05:37 )             32.8     02-20    137  |  105  |  6<L>  ----------------------------<  79  3.5   |  21<L>  |  0.79    Ca    8.4      20 Feb 2019 05:36  Phos  3.0     02-20  Mg     1.8     02-20    TPro  7.5  /  Alb  4.4  /  TBili  0.2  /  DBili  x   /  AST  10  /  ALT  17  /  AlkPhos  40  02-19            RADIOLOGY & ADDITIONAL STUDIES:

## 2019-02-20 NOTE — PROGRESS NOTE ADULT - PROBLEM SELECTOR PLAN 3
F: NS at 125 cc/hr  E: replete electrolytes K< 4, Mg <2  N: clear liquid diet  DVT PPx: Hep SQ  Code status: Full Code

## 2019-02-20 NOTE — PROGRESS NOTE ADULT - PROBLEM SELECTOR PLAN 1
Pt presents with 1 week of nausea, abd cramps, poor PO intake and greater than 20 bloody bowel movments. Feels that sx are simiar to her past UC flares.  Pt remains afebrile with normal WBC count.   -c/w mesalamine 1600 mg daily  -20 IV solumedrol once C diff returns negative  -anticipate starting biologic.   - f/u quantiferon   - f/u Stool O&P, stool PCR  - Hepatitis B serologies negative   - zofran, pepcid   - tylenol for pain  - c/w NS at 125 ml/hr  -c/w HSQ as pts with UC (yoly in UC flare) in hypercoaguable state and at increased risk of stroke Pt presents with 1 week of nausea, abd cramps, poor PO intake and greater than 20 bloody bowel movments. Feels that sx are simiar to her past UC flares.  Pt remains afebrile with normal WBC count.   -c/w mesalamine 1600 mg daily  - C. diff negative, thus will start solumedrol  - Start 20 IV solumedrol IV Q8hr   - Plan to start Infliximab this admission once Quantiferon results return   - Hepatitis B serologies negative   - f/u Quantiferon   - f/u stool O&P, stool PCR  - Zofran, Pepcid   - Tylenol for pain  - c/w NS at 125 ml/hr  - c/w HSQ as pts with UC (yoly in UC flare) in hypercoaguable state and at increased risk of stroke

## 2019-02-20 NOTE — ED ADULT NURSE REASSESSMENT NOTE - NS ED NURSE REASSESS COMMENT FT1
Patient a/oX 3, anxious , c/o of abdominal pain and rectal bleed, Dr. Hester made aware. Vital signs stable.  Additional labs sent as ordered.  Ofirmev IVPB completed;  NSS ongoing 125ml/hr.  On clear liquid diet.  Room assignment pending.  For AM labs.

## 2019-02-20 NOTE — H&P ADULT - PROBLEM SELECTOR PLAN 1
-Worsening of symptoms (abdominal cramping and diarrhea) consistent with prior flares.  -Afebrile with normal WBC count. Holding abx.  -C/w mesalamine for now  -anticipate starting biologic. F/u quantiferon and HBV serologies.  -zofran, pepcid   -tylenol for pain  -IVF  -r/o c diff -Worsening of symptoms (abdominal cramping and diarrhea) consistent with prior flares.  -Afebrile with normal WBC count. Holding abx.  -C/w mesalamine for now  -20 IV solumedrol once C diff returns negative  -anticipate starting biologic. F/u quantiferon and HBV serologies.  -zofran, pepcid   -tylenol for pain  -IVF  -r/o c diff

## 2019-02-20 NOTE — PROGRESS NOTE ADULT - PROBLEM SELECTOR PLAN 2
Pt with Hgb 10.8 this AM, down from 13.7 at time of admission. Pt endorses 1 week of bright red bloody BMs which have continued during this admission. Pt also s/p 1L NS, which likely contributing hemodilution component to ongoing anemia.   - f/u repeat CBC  -maintain active T&S  -continuing with HSQ for now as per GI. D/C if bleeding does not improve or H/H continues to trend down . Pts with UC (yoly in UC flare) in hypercoaguable state and at increased risk of stroke  - consider iron studies in AM labs Pt with Hgb 10.8 this AM, down from 13.7 at time of admission. Pt endorses 1 week of bright red bloody BMs which have continued during this admission. Pt also s/p 1L NS, which likely contributing hemodilution component to ongoing anemia.   - f/u repeat CBC  -maintain active T&S  -c/w Hep SQ for per GI. Will DC hep SQ if bleeding does not improve or H/H continues to trend down.  (Pts with UC (yoly in UC flare) in hypercoaguable state and at increased risk of stroke, thus benefits outweigh risks for this pt)  - consider iron studies in AM labs

## 2019-02-20 NOTE — H&P ADULT - PROBLEM SELECTOR PLAN 4
Problem: Transition of care performed with sharing of clinical summary.  Plan: 1) PCP [facility] Contacted on Admission: (Y/N) --> N  2) Date of Contact with PCP:  3) PCP Contacted at Discharge: (Y/N)  4) Summary of Handoff Given to PCP:   5) Post-Discharge Appointment Date and Location: [facility]  Full code  [facility]

## 2019-02-20 NOTE — H&P ADULT - ASSESSMENT
23F with hx c diff and UC presenting with abd cramping and bloody diarrhea. 23F with hx c diff and UC presenting with abd cramping and bloody diarrhea, likely UC flare.

## 2019-02-20 NOTE — CONSULT NOTE ADULT - ATTENDING COMMENTS
Long discussion with patient//mother re risk/benefit of ifx infusion.  Detailed discussion of short and long term risks.  However, it's clear that she's unable to survive as outpatient and is resistant to the effect of steroids, making this treatment less elective.

## 2019-02-20 NOTE — H&P ADULT - PROBLEM SELECTOR PLAN 5
Problem: Transition of care performed with sharing of clinical summary.  Plan: 1) PCP [facility] Contacted on Admission: (Y/N) --> N  2) Date of Contact with PCP:  3) PCP Contacted at Discharge: (Y/N)  4) Summary of Handoff Given to PCP:   5) Post-Discharge Appointment Date and Location:

## 2019-02-20 NOTE — H&P ADULT - NSHPLABSRESULTS_GEN_ALL_CORE
13.7   9.58  )-----------( 354      ( 19 Feb 2019 21:20 )             39.6       LIVER FUNCTIONS - ( 19 Feb 2019 21:20 )  Alb: 4.4 g/dL / Pro: 7.5 g/dL / ALK PHOS: 40 U/L / ALT: 17 U/L / AST: 10 U/L / GGT: x

## 2019-02-20 NOTE — H&P ADULT - PROBLEM SELECTOR PLAN 2
F: NS at 125/hr  E: replete PRN  N: clear liquids -likely multifactorial, acute blood loss in stool with dilutional component.  -trend H/H  -active T&S  -continuing with HSQ for now as per GI. D/C if bleeding does not improve or H/H continues to trend down  -initial H/H WNL. Holding off on iron studies.

## 2019-02-20 NOTE — ED ADULT NURSE REASSESSMENT NOTE - NS ED NURSE REASSESS COMMENT FT1
Patient a/oX 3, anxious, c/o of abdominal cramping pain and diarrhea episodes w/ rectal bleed.  Vital signs stable.  Proctosol / topical steroid given to patient for pain.  Patient refused Heparin AM dose and 2nd dose of Ofirmev IV for pain. Clear liquids observed.  NSS ongoing 125ml/hr, tolerating well.  For admit .  Room assignment pending.  Cdiff result pending.

## 2019-02-20 NOTE — PROGRESS NOTE ADULT - SUBJECTIVE AND OBJECTIVE BOX
INCOMPLETE NOTE  O/N Events: Pt with bloody bowel movements overnight.     Subjective/ROS: Denies HA, CP, SOB, vomiting, changes in urinary habits. Unable to tolerate PO due to nausea, cramping and bloody bowel movements which begin shortly after PO intake. Pt denies emesis. Cramping is constant, 10/10 unrelieved by bowel movements. Endorses 5lb unintentional weight loss over the past week.     VITALS  Vital Signs Last 24 Hrs  T(C): 36.7 (2019 04:29), Max: 36.7 (2019 04:29)  T(F): 98 (2019 04:29), Max: 98 (2019 04:29)  HR: 69 (2019 04:29) (69 - 81)  BP: 106/70 (2019 04:29) (106/70 - 116/78)  BP(mean): --  RR: 16 (2019 04:29) (16 - 18)  SpO2: 98% (2019 04:29) (96% - 98%)    CAPILLARY BLOOD GLUCOSE          PHYSICAL EXAM  General: A&Ox3; Laying uncomfortably in bed  Head: NC/AT; dry MM; PERRL; EOMI;  Neck: Supple; no JVD  Respiratory: CTA B/L; no wheezes/crackles   Cardiovascular: Regular rhythm/rate; S1/S2, murmur heard at L sternal border   Gastrointestinal: Soft; diffuse tenderness to superficial palpation in all 4 quadrants  Extremities: WWP; no edema/cyanosis  Neurological:  CNII-XII grossly intact; no obvious focal deficits    MEDICATIONS  (STANDING):  acetaminophen  IVPB .. 1000 milliGRAM(s) IV Intermittent once  calcium carbonate 1250 mG  + Vitamin D (OsCal 500 + D) 1 Tablet(s) Oral daily  heparin  Injectable 5000 Unit(s) SubCutaneous every 8 hours  hydrocortisone 2.5% Rectal Cream 1 Application(s) Rectal three times a day  mesalamine DR Capsule 1600 milliGRAM(s) Oral daily  sodium chloride 0.9%. 1000 milliLiter(s) (125 mL/Hr) IV Continuous <Continuous>    MEDICATIONS  (PRN):  acetaminophen   Tablet .. 650 milliGRAM(s) Oral every 6 hours PRN Moderate Pain (4 - 6)      No Known Allergies      LABS                        10.8   9.70  )-----------( 278      ( 2019 05:37 )             32.8     02-20    137  |  105  |  6<L>  ----------------------------<  79  3.5   |  21<L>  |  0.79    Ca    8.4      2019 05:36  Phos  3.0     02-  Mg     1.8         TPro  7.5  /  Alb  4.4  /  TBili  0.2  /  DBili  x   /  AST  10  /  ALT  17  /  AlkPhos  40        Urinalysis Basic - ( 2019 23:54 )    Color: Yellow / Appearance: Clear / S.020 / pH: x  Gluc: x / Ketone: 15 mg/dL  / Bili: Negative / Urobili: 0.2 E.U./dL   Blood: x / Protein: NEGATIVE mg/dL / Nitrite: NEGATIVE   Leuk Esterase: NEGATIVE / RBC: x / WBC x   Sq Epi: x / Non Sq Epi: x / Bacteria: x            IMAGING/EKG/ETC O/N Events: Pt with bloody bowel movements overnight.     Subjective/ROS: Denies HA, CP, SOB, vomiting, changes in urinary habits. Unable to tolerate PO due to nausea, cramping and bloody bowel movements which begin shortly after PO intake. Pt denies emesis. Cramping is constant, 10/10 unrelieved by bowel movements. Endorses 5lb unintentional weight loss over the past week.     VITALS  Vital Signs Last 24 Hrs  T(C): 36.7 (2019 04:29), Max: 36.7 (2019 04:29)  T(F): 98 (2019 04:29), Max: 98 (2019 04:29)  HR: 69 (2019 04:29) (69 - 81)  BP: 106/70 (2019 04:29) (106/70 - 116/78)  BP(mean): --  RR: 16 (2019 04:29) (16 - 18)  SpO2: 98% (2019 04:29) (96% - 98%)    CAPILLARY BLOOD GLUCOSE          PHYSICAL EXAM  General: A&Ox3; Laying uncomfortably in bed  Head: NC/AT; dry MM; PERRL; EOMI;  Neck: Supple; no JVD  Respiratory: CTA B/L; no wheezes/crackles   Cardiovascular: Regular rhythm/rate; S1/S2, murmur heard at L sternal border   Gastrointestinal: Soft; diffuse tenderness to superficial palpation in all 4 quadrants  Extremities: WWP; no edema/cyanosis  Neurological:  CNII-XII grossly intact; no obvious focal deficits    MEDICATIONS  (STANDING):  acetaminophen  IVPB .. 1000 milliGRAM(s) IV Intermittent once  calcium carbonate 1250 mG  + Vitamin D (OsCal 500 + D) 1 Tablet(s) Oral daily  heparin  Injectable 5000 Unit(s) SubCutaneous every 8 hours  hydrocortisone 2.5% Rectal Cream 1 Application(s) Rectal three times a day  mesalamine DR Capsule 1600 milliGRAM(s) Oral daily  sodium chloride 0.9%. 1000 milliLiter(s) (125 mL/Hr) IV Continuous <Continuous>    MEDICATIONS  (PRN):  acetaminophen   Tablet .. 650 milliGRAM(s) Oral every 6 hours PRN Moderate Pain (4 - 6)      No Known Allergies      LABS                        10.8   9.70  )-----------( 278      ( 2019 05:37 )             32.8     02-20    137  |  105  |  6<L>  ----------------------------<  79  3.5   |  21<L>  |  0.79    Ca    8.4      2019 05:36  Phos  3.0     02-20  Mg     1.8     -    TPro  7.5  /  Alb  4.4  /  TBili  0.2  /  DBili  x   /  AST  10  /  ALT  17  /  AlkPhos  40  -      Urinalysis Basic - ( 2019 23:54 )    Color: Yellow / Appearance: Clear / S.020 / pH: x  Gluc: x / Ketone: 15 mg/dL  / Bili: Negative / Urobili: 0.2 E.U./dL   Blood: x / Protein: NEGATIVE mg/dL / Nitrite: NEGATIVE   Leuk Esterase: NEGATIVE / RBC: x / WBC x   Sq Epi: x / Non Sq Epi: x / Bacteria: x            IMAGING/EKG/ETC

## 2019-02-20 NOTE — PROGRESS NOTE ADULT - PROBLEM SELECTOR PLAN 4
Problem: Transition of care performed with sharing of clinical summary.  Plan: 1) PRESTON Terry   PCP: Dr. Joao Madden   Contacted on Admission: (Y/N) -->   2) Date of Contact with PCP:  3) PCP Contacted at Discharge: (Y/N)  4) Summary of Handoff Given to PCP:   5) Post-Discharge Appointment Date and Location: Problem: Transition of care performed with sharing of clinical summary.  Plan: 1) GI Dr. Terry & GI team aware and following pt  PCP: Dr. Joao Madden  called on 2/20. Dr. Madden no longer at the practice. Off staff states that pt follows at office when she is sick.   Contacted on Admission: (Y/N) --> Y  2) Date of Contact with PCP: 2/19, 2/20  3) PCP Contacted at Discharge: (Y/N)  4) Summary of Handoff Given to PCP:   5) Post-Discharge Appointment Date and Location:

## 2019-02-20 NOTE — H&P ADULT - NSHPPHYSICALEXAM_GEN_ALL_CORE
General:  NAD, nontoxic appearing, WDWN  HENT:  EOMI, PERRL.  No sinus tenderness.  oropharynx WNL.    Neck:  Trachea midline.  No JVD, LAD, or thyromegaly.  Heart:  S1S2 no M/R/G, regular  Lungs:  CTAB no wheezing, rhonchi or rales.  No accessory muscle use.  No respiratory distress.  Abdomen:  NABS.  soft, mild lower abd tenderness, nondistended.  no guarding.  no ascites.  no organomegaly.  Vascular:  Peripheral pulses palpable  Extremities:  No edema  Back:  No CVA tenderness  Neuro:  AOx3, no facial asymmetry, nonfocal, no slurred speech  Skin:  No rash General:  NAD, nontoxic appearing, WDWN  HENT:  EOMI, PERRL.  No sinus tenderness.  oropharynx WNL.  MMM  Neck:  Trachea midline.  No JVD, LAD, or thyromegaly.  Heart:  S1S2 no M/R/G, regular  Lungs:  CTAB no wheezing, rhonchi or rales.  No accessory muscle use.  No respiratory distress.  Abdomen:  NABS.  soft, mild lower abd tenderness, nondistended.  no guarding.  no ascites.  no organomegaly.  Vascular:  Peripheral pulses palpable  Extremities:  No edema  Back:  No CVA tenderness  Neuro:  AOx3, no facial asymmetry, nonfocal, no slurred speech  Skin:  No rash

## 2019-02-20 NOTE — H&P ADULT - HISTORY OF PRESENT ILLNESS
Pt is a 23F with a history of UC and recurrent c diff presenting with one week of progressive abdominal cramping and bloody diarrhea. She says she is having up to 20 BMs per day. Outpt GI recommended she come to the ED. Denies F/C, N/V. She says symptoms are typical of her prior UC flares, most recently a few weeks ago. She was diagnosed 8 months ago and has been on mesalamine. She was on PO steroids for a flare a few weeks ago which were discontinued. She then developed recurrence of symptoms so Dr. Terry advised her to restart prednisone, which she has been taking for the past several days. She started with 5 mg, and has been increasing by about 10 mg daily. She took 40 mg today. No relief in symptoms. Otherwise still taking her mesalamine. Some blood streaks in the stool. She had a recent c diff test which was negative in the past 1-2 weeks. Last c scope a few weeks ago. Denies family history of IBD. Denies lightheadedness, CP, palp, headache, cough, SOB,  symptoms, calf pain or edema.     ROS otherwise negative.    In the ED, VSS. Labs unremarkable aside from elevated CRP. Stool studies collected. She received 1L NS. Refused pepcid and zofran. Pt is a 23F with a history of U/C and recurrent c. diff (x3) presenting with one week of progressive abdominal cramping and bloody diarrhea. She says she is having up to 20 BMs per day. Outpt GI recommended she come to the ED. Denies F/C, N/V. She says symptoms are typical of her prior U/C flares, of which she most recently had one a few weeks ago. She was diagnosed 8 months ago and has been on mesalamine. She was on PO steroids for a flare a few weeks ago which were discontinued. She then developed recurrence of symptoms so Dr. Terry advised her to restart prednisone, which she has been taking for the past several days. She started with 5 mg, and has been increasing by about 10 mg daily. She took 40 mg today. No relief in symptoms. Otherwise still taking her mesalamine. Some blood streaks in the stool. Completed vanc and fidaxomicin for c diff; last test was negative just in the last few weeks. Last c scope a few weeks ago. Celiac serologies neg. Denies family history of IBD. Denies lightheadedness, CP, palp, headache, cough, SOB,  symptoms, calf pain or edema.     ROS otherwise negative.    In the ED, VSS. Labs unremarkable aside from elevated CRP. Stool studies collected. She received 1L NS. Refused pepcid and zofran. Pt is a 23F with a history of U/C and recurrent c. diff (x3) presenting with one week of progressive abdominal cramping and bloody diarrhea. She says she is having up to 20 BMs per day. Outpt GI recommended she come to the ED. Denies F/C, N/V. She says symptoms are typical of her prior U/C flares, of which she most recently had one a few weeks ago. She was diagnosed 8 months ago and has been on mesalamine. She was on PO steroids for a flare a few weeks ago which were discontinued. She then developed recurrence of symptoms so Dr. Terry advised her to restart prednisone, which she has been taking for the past several days. She started with 5 mg, and has been increasing by about 10 mg daily. She took 40 mg today. No relief in symptoms. Otherwise still taking her mesalamine. Outpt GI anticipated starting biologic therapy soon. Completed vanc and fidaxomicin for c diff; last test was negative just in the last two weeks. Last c scope a few weeks ago. Celiac serologies neg. Denies family history of IBD. Denies lightheadedness, CP, palp, headache, cough, SOB,  symptoms, calf pain or edema. ROS otherwise negative.    In the ED, VSS. Labs unremarkable aside from elevated CRP. Stool studies collected. She received 1L NS. Refused pepcid and zofran.

## 2019-02-20 NOTE — CONSULT NOTE ADULT - ASSESSMENT
23F with a history of U/C and recurrent c. diff (x3) who presents with UC flare    1. UC flare  -Please rule out infectious etiology ( C diff, stool PCR). If negative, please start solumederol 20 mg IV q8h  -plan to start infliximab today- Hep B studies negative, Quantiferon pending but Aug quantiferon negative  -pleas continue with supportive care- IVF, pain control  -Hep Sub Q for DVT ppx

## 2019-02-21 ENCOUNTER — TRANSCRIPTION ENCOUNTER (OUTPATIENT)
Age: 24
End: 2019-02-21

## 2019-02-21 VITALS — WEIGHT: 116.62 LBS

## 2019-02-21 LAB
ALBUMIN SERPL ELPH-MCNC: 3.7 G/DL — SIGNIFICANT CHANGE UP (ref 3.3–5)
ALP SERPL-CCNC: 39 U/L — LOW (ref 40–120)
ALT FLD-CCNC: 20 U/L — SIGNIFICANT CHANGE UP (ref 10–45)
ANION GAP SERPL CALC-SCNC: 12 MMOL/L — SIGNIFICANT CHANGE UP (ref 5–17)
AST SERPL-CCNC: 12 U/L — SIGNIFICANT CHANGE UP (ref 10–40)
BILIRUB SERPL-MCNC: 0.3 MG/DL — SIGNIFICANT CHANGE UP (ref 0.2–1.2)
BUN SERPL-MCNC: 5 MG/DL — LOW (ref 7–23)
CALCIUM SERPL-MCNC: 9.6 MG/DL — SIGNIFICANT CHANGE UP (ref 8.4–10.5)
CHLORIDE SERPL-SCNC: 105 MMOL/L — SIGNIFICANT CHANGE UP (ref 96–108)
CO2 SERPL-SCNC: 22 MMOL/L — SIGNIFICANT CHANGE UP (ref 22–31)
CREAT SERPL-MCNC: 0.75 MG/DL — SIGNIFICANT CHANGE UP (ref 0.5–1.3)
GLUCOSE SERPL-MCNC: 96 MG/DL — SIGNIFICANT CHANGE UP (ref 70–99)
HBV E AB SER-ACNC: NEGATIVE — SIGNIFICANT CHANGE UP
HBV E AG SER-ACNC: NEGATIVE — SIGNIFICANT CHANGE UP
HCT VFR BLD CALC: 38.3 % — SIGNIFICANT CHANGE UP (ref 34.5–45)
HGB BLD-MCNC: 12.7 G/DL — SIGNIFICANT CHANGE UP (ref 11.5–15.5)
MAGNESIUM SERPL-MCNC: 2.1 MG/DL — SIGNIFICANT CHANGE UP (ref 1.6–2.6)
MCHC RBC-ENTMCNC: 30.5 PG — SIGNIFICANT CHANGE UP (ref 27–34)
MCHC RBC-ENTMCNC: 33.2 GM/DL — SIGNIFICANT CHANGE UP (ref 32–36)
MCV RBC AUTO: 91.8 FL — SIGNIFICANT CHANGE UP (ref 80–100)
NRBC # BLD: 0 /100 WBCS — SIGNIFICANT CHANGE UP (ref 0–0)
PHOSPHATE SERPL-MCNC: 5.1 MG/DL — HIGH (ref 2.5–4.5)
PLATELET # BLD AUTO: 339 K/UL — SIGNIFICANT CHANGE UP (ref 150–400)
POTASSIUM SERPL-MCNC: 4.9 MMOL/L — SIGNIFICANT CHANGE UP (ref 3.5–5.3)
POTASSIUM SERPL-SCNC: 4.9 MMOL/L — SIGNIFICANT CHANGE UP (ref 3.5–5.3)
PROT SERPL-MCNC: 6.6 G/DL — SIGNIFICANT CHANGE UP (ref 6–8.3)
RBC # BLD: 4.17 M/UL — SIGNIFICANT CHANGE UP (ref 3.8–5.2)
RBC # FLD: 11.4 % — SIGNIFICANT CHANGE UP (ref 10.3–14.5)
SODIUM SERPL-SCNC: 139 MMOL/L — SIGNIFICANT CHANGE UP (ref 135–145)
WBC # BLD: 8.16 K/UL — SIGNIFICANT CHANGE UP (ref 3.8–10.5)
WBC # FLD AUTO: 8.16 K/UL — SIGNIFICANT CHANGE UP (ref 3.8–10.5)

## 2019-02-21 PROCEDURE — 99232 SBSQ HOSP IP/OBS MODERATE 35: CPT | Mod: GC

## 2019-02-21 RX ORDER — POLYETHYLENE GLYCOL 3350 17 G/17G
17 POWDER, FOR SOLUTION ORAL
Qty: 0 | Refills: 0 | COMMUNITY

## 2019-02-21 RX ORDER — OXYCODONE AND ACETAMINOPHEN 5; 325 MG/1; MG/1
1 TABLET ORAL ONCE
Qty: 0 | Refills: 0 | Status: DISCONTINUED | OUTPATIENT
Start: 2019-02-21 | End: 2019-02-21

## 2019-02-21 RX ADMIN — Medication 800 MILLIGRAM(S): at 09:03

## 2019-02-21 RX ADMIN — HEPARIN SODIUM 5000 UNIT(S): 5000 INJECTION INTRAVENOUS; SUBCUTANEOUS at 06:20

## 2019-02-21 RX ADMIN — Medication 1 APPLICATION(S): at 14:05

## 2019-02-21 RX ADMIN — Medication 1 TABLET(S): at 14:04

## 2019-02-21 RX ADMIN — HEPARIN SODIUM 5000 UNIT(S): 5000 INJECTION INTRAVENOUS; SUBCUTANEOUS at 14:04

## 2019-02-21 RX ADMIN — Medication 20 MILLIGRAM(S): at 06:20

## 2019-02-21 RX ADMIN — OXYCODONE AND ACETAMINOPHEN 1 TABLET(S): 5; 325 TABLET ORAL at 07:51

## 2019-02-21 RX ADMIN — Medication 1 APPLICATION(S): at 06:22

## 2019-02-21 RX ADMIN — OXYCODONE AND ACETAMINOPHEN 1 TABLET(S): 5; 325 TABLET ORAL at 09:03

## 2019-02-21 RX ADMIN — Medication 20 MILLIGRAM(S): at 14:04

## 2019-02-21 NOTE — DISCHARGE NOTE ADULT - CARE PLAN
Principal Discharge DX:	Ulcerative colitis, acute, with rectal bleeding  Goal:	To treat your condition  Assessment and plan of treatment:	You came into the hospital because of your ulcerative colitis. You were started on a medication called Remicade(immunosuppresive) to treat your condition. You were also seen by our gastroenterology team. You will be discharged on Prednisone 40mg for one week until you follow up with Dr. Durham. He will decide on your next infusion of Remicade. You will continue your mesalamine.

## 2019-02-21 NOTE — PROGRESS NOTE ADULT - I HAVE PERSONALLY SEEN, EXAMINED, AND PARTICIPATED IN THE CARE OF THIS PATIENT.  I HAVE REVIEWED ALL PERTINENT CLINICAL INFORMATION, INCLUDING HISTORY, PHYSICAL EXAM, PLAN AND THE MEDICAL/PA/NP STUDENT’S NOTE AND AGREE EXCEPT AS NOTED.
Recorded as Task  Date: 05/06/2018 08:47 AM, Created By: Tyler Gale  Task Name: Answering Service  Assigned To: HILARIA REYES  Regarding Patient: ANDREW NICOLAS, Status: Active  Comment:   Tyler Gale - 06 May 2018 8:47 AM    [employer] Message Sent  [employer] Documentation  The below message was copy and pasted from a [employer] page.      MESSAGE: [phone] ACC URGENT CALLER NAME: HOA RE: ANDREW NICOLAS PATIENT 2005 PATIENT PCP:  IF RX, PHARMACY #: N/A HOA MENTIONED SYMPTOMS OF A FEVER AND PATIENT LOOKS WEAK OR ILL THEN DECLINED TRIAGE AND AFTER APPOINTMENT WAS MADE SHE STATED THAT PATIENTS ISN'T WEAK OR ILL HE JUST ACHES PLEASE FOLLOW UP AND ASSIST MOTHER ALSO DECLINED TO PAGE DR. SCOTT* FORMID:[id number]        READ BACK MESSAGE TO PATIENT    I called patient after this message was received and Mom states that she did not want to speak to a physician. She has an appointment for 5/7.      Electronically signed by:HILARIA REYES D.O.  May  6 2018  8:57AM CST      Electronically signed by:LUIS DANIEL DUMONT MD  May  7 2018 11:54AM CST Review   
Statement Selected
Statement Selected

## 2019-02-21 NOTE — DISCHARGE NOTE ADULT - PATIENT PORTAL LINK FT
You can access the [facility] Patient Portal, offered by [facility], by registering with the following website: [email or web address]

## 2019-02-21 NOTE — PROGRESS NOTE ADULT - ATTENDING COMMENTS
Dramatic benefit after 1st loading dose of IFX  -explained no oupt narcotics.  -cont pred 40mg/d, ok to cont 5asa.  - Will arrange f/u with us next week.  -continue loading as outpt.
Patient seen and examined with house-staff during bedside rounds.  Resident note read, including vitals, physical findings, laboratory data, and radiological reports.   Revisions included below.  Direct personal management at bed side and extensive interpretation of the data.  Plan was outlined and discussed in details with the housestaff.  Decision making of high complexity  Action taken for acute disease activity to reflect the level of care provided:  - medication reconciliation  - review laboratory data

## 2019-02-21 NOTE — DIETITIAN INITIAL EVALUATION ADULT. - ENERGY NEEDS
ABW used for calculations as pt between % of IBW.   ABW 52.9kg, IBW 52kg, 100% IBW, ht 63", BMI 20.7   Nutrient needs based on [facility] standards of care for maintenance in adults, adjusted for inflammation

## 2019-02-21 NOTE — PROGRESS NOTE ADULT - SUBJECTIVE AND OBJECTIVE BOX
Pt seen and examined at bedside.  Pt tolerated the infliximab infusion, with no issues.  She now reports feeling much better. The number of BMS have decreased and they are no longer bloody. She reports that prior to the infusion she was using the bathroom every few minutes, and nowshe can go longer periods of time. She is not sure how many times she has gone overnight but she said it is significantly less. She still has some abdominal cramps, but no nausea,vomiting    PERTINENT REVIEW OF SYSTEMS:  CONSTITUTIONAL: No weakness, fevers or chills  HEENT: No visual changes; No vertigo or throat pain   GASTROINTESTINAL: No abdominal or epigastric pain. No nausea, vomiting, or hematemesis; No diarrhea or constipation. No melena or hematochezia.  NEUROLOGICAL: No numbness or weakness  SKIN: No itching, burning, rashes, or lesions     Allergies    No Known Allergies    Intolerances      MEDICATIONS:  MEDICATIONS  (STANDING):  acetaminophen  IVPB .. 1000 milliGRAM(s) IV Intermittent once  calcium carbonate 1250 mG  + Vitamin D (OsCal 500 + D) 1 Tablet(s) Oral daily  heparin  Injectable 5000 Unit(s) SubCutaneous every 8 hours  hydrocortisone 2.5% Rectal Cream 1 Application(s) Rectal three times a day  mesalamine DR Capsule 800 milliGRAM(s) Oral two times a day  methylPREDNISolone sodium succinate Injectable 20 milliGRAM(s) IV Push every 8 hours  sodium chloride 0.9%. 1000 milliLiter(s) (125 mL/Hr) IV Continuous <Continuous>    MEDICATIONS  (PRN):  acetaminophen   Tablet .. 650 milliGRAM(s) Oral every 6 hours PRN Moderate Pain (4 - 6)    Vital Signs Last 24 Hrs  T(C): 36.9 (2019 05:58), Max: 37.1 (2019 09:42)  T(F): 98.5 (2019 05:58), Max: 98.8 (2019 16:11)  HR: 82 (2019 05:58) (74 - 85)  BP: 107/69 (2019 05:58) (101/67 - 122/79)  BP(mean): 87 (2019 21:06) (87 - 87)  RR: 14 (2019 05:58) (14 - 16)  SpO2: 97% (2019 05:58) (96% - 99%)    PHYSICAL EXAM:    General: Well developed; well nourished; in no acute distress  HEENT: MMM, conjunctiva and sclera clear  Gastrointestinal: Soft non-tender non-distended; Normal bowel sounds; No hepatosplenomegaly. No rebound or guarding  Skin: Warm and dry. No obvious rash    LABS:                        12.7   8.16  )-----------( 339      ( 2019 05:50 )             38.3         139  |  105  |  5<L>  ----------------------------<  96  4.9   |  22  |  0.75    Ca    9.6      2019 05:49  Phos  5.1       Mg     2.1         TPro  6.6  /  Alb  3.7  /  TBili  0.3  /  DBili  x   /  AST  12  /  ALT  20  /  AlkPhos  39<L>            Urinalysis Basic - ( 2019 23:54 )    Color: Yellow / Appearance: Clear / S.020 / pH: x  Gluc: x / Ketone: 15 mg/dL  / Bili: Negative / Urobili: 0.2 E.U./dL   Blood: x / Protein: NEGATIVE mg/dL / Nitrite: NEGATIVE   Leuk Esterase: NEGATIVE / RBC: x / WBC x   Sq Epi: x / Non Sq Epi: x / Bacteria: x                RADIOLOGY & ADDITIONAL STUDIES:

## 2019-02-21 NOTE — DIETITIAN INITIAL EVALUATION ADULT. - PROBLEM SELECTOR PLAN 2
-likely multifactorial, acute blood loss in stool with dilutional component.  -trend H/H  -active T&S  -continuing with HSQ for now as per GI. D/C if bleeding does not improve or H/H continues to trend down  -initial H/H WNL. Holding off on iron studies.

## 2019-02-21 NOTE — DISCHARGE NOTE ADULT - PLAN OF CARE
To treat your condition You came into the hospital because of your ulcerative colitis. You were started on a medication called Remicade(immunosuppresive) to treat your condition. You were also seen by our gastroenterology team. You will be discharged on Prednisone 40mg for one week until you follow up with Dr. Durham. He will decide on your next infusion of Remicade. You will continue your mesalamine.

## 2019-02-21 NOTE — DISCHARGE NOTE ADULT - ADDITIONAL INSTRUCTIONS
Please follow up with your primary care physician and Dr. Durham(information below) that you see within 1 week of discharge from the hospital.  If you experience any new or worsening symptoms please contact 911 or go to an emergency room immediately.  Please take all medications as prescribed.

## 2019-02-21 NOTE — PROGRESS NOTE ADULT - ASSESSMENT
23F with a history of U/C and recurrent c. diff (x3) who presents with UC flare    1. UC flare  -C diff negative  -Now s/p first infliximab infusion with overall improvement  -On solumederol 20 mg IV q8h, will likely start tapering today  -Hep B studies negative, Aug quantiferon negative  -pleas continue with supportive care- IVF, pain control  -Hep Sub Q for DVT ppx   -can advance diet as tolerated 23F with a history of U/C and recurrent c. diff (x3) who presents with UC flare    1. UC flare  -C diff negative  -Now s/p first infliximab infusion with overall improvement in symptoms ( decreased frequency of BMs). Next infusion will be in two weeks  -On solumederol 20 mg IV q8h while in the hospital. Can discharge on prednisone 40 mg po daily, which pt can continue to take until next clinic appointment with Dr. Durham in 1 week   -avoid narcotics  -advance diet as tolerated  -Hep Sub Q for DVT ppx   -can advance diet as tolerated

## 2019-02-21 NOTE — DISCHARGE NOTE ADULT - MEDICATION SUMMARY - MEDICATIONS TO TAKE
I will START or STAY ON the medications listed below when I get home from the hospital:    Apriso 0.375 g oral capsule, extended release  -- 4 cap(s) by mouth once a day (in the morning)  -- Indication: For Ulcerative colitis, acute, with rectal bleeding    predniSONE 20 mg oral tablet  -- 2 tab(s) by mouth once a day  -- Indication: For Ulcerative colitis, acute, with rectal bleeding    Proctosol-HC 2.5% rectal cream with applicator  -- 1 application rectally 3 times a day  -- Indication: For Ulcerative colitis, acute, with rectal bleeding    lidocaine topical  -- Indication: For Prophylactic measure    Low-Ogestrel 0.3 mg-30 mcg oral tablet  -- 1 tab(s) by mouth once a day  -- Indication: For Prophylactic measure    NuvaRing 0.120 mg-0.015 mg/24 hours vaginal ring  -- Indication: For Prophylactic measure    calcium-vitamin D 500 mg-200 intl units oral tablet  -- 1 tab(s) by mouth once a day  -- Indication: For Ulcerative colitis, acute, with rectal bleeding

## 2019-02-21 NOTE — DISCHARGE NOTE ADULT - CARE PROVIDER_API CALL
Ming Durham)  Gastroenterology; Internal Medicine  [address] [place] [address]  Phone: [phone]  Fax: [phone]  Follow Up Time:

## 2019-02-21 NOTE — DIETITIAN INITIAL EVALUATION ADULT. - OTHER INFO
23F with a history of U/C and recurrent c. diff (x3) who presents with UC flare, BMs now slowing down, brown with no blood noted, feeling improved with some abdominal cramping/ tender abdomen still noted. c.diff negative. Pt tolerated the infliximab infusion, with no issues. Tolerating full liquid diet well, unclear why on CCD, likely in setting of solumedrol to control BS levels however pt BS remains WNL, recommend liberalizing to regular Kosher as feasible. No noted n/v/c, chewing/ swallowing issues noted, skin is intact. Will continue to follow per protocol.

## 2019-02-21 NOTE — DISCHARGE NOTE ADULT - HOSPITAL COURSE
23F with hx c diff and UC presenting with 1 week of abd cramping and bloody diarrhea, admitted for UC flare. She was ruled out for c. diff. Other stool studies are pending. She was started on solumedrol 20mg IV QD, mesalamine was continued, and she received her first infusion of Remicade which was well tolerated. Her diarrhea has improved and she is tolerating PO. She is stable for discharge and will follow up with Dr. Durham for further management. She will continue Prednisone 40mg QD and her normal mesalamine dose.

## 2019-02-21 NOTE — PROGRESS NOTE ADULT - SUBJECTIVE AND OBJECTIVE BOX
INCOMPLETE NOTE  O/N Events:  Subjective/ROS: Denies HA, CP, SOB, n/v, changes in bowel/urinary habits.  12pt ROS otherwise negative.    VITALS  Vital Signs Last 24 Hrs  T(C): 36.9 (2019 05:58), Max: 37.1 (2019 09:42)  T(F): 98.5 (2019 05:58), Max: 98.8 (2019 16:11)  HR: 82 (2019 05:58) (74 - 85)  BP: 107/69 (2019 05:58) (101/67 - 122/79)  BP(mean): 87 (2019 21:06) (87 - 87)  RR: 14 (2019 05:58) (14 - 16)  SpO2: 97% (2019 05:58) (96% - 99%)    CAPILLARY BLOOD GLUCOSE          PHYSICAL EXAM  General: A&Ox3; NAD  Head: NC/AT; MMM; PERRL; EOMI;  Neck: Supple; no JVD  Respiratory: CTA B/L; no wheezes/crackles   Cardiovascular: Regular rhythm/rate; S1/S2   Gastrointestinal: Soft; NTND; normoactive BS  Extremities: WWP; no edema/cyanosis  Neurological:  CNII-XII grossly intact; no obvious focal deficits    MEDICATIONS  (STANDING):  acetaminophen  IVPB .. 1000 milliGRAM(s) IV Intermittent once  calcium carbonate 1250 mG  + Vitamin D (OsCal 500 + D) 1 Tablet(s) Oral daily  heparin  Injectable 5000 Unit(s) SubCutaneous every 8 hours  hydrocortisone 2.5% Rectal Cream 1 Application(s) Rectal three times a day  mesalamine DR Capsule 800 milliGRAM(s) Oral two times a day  methylPREDNISolone sodium succinate Injectable 20 milliGRAM(s) IV Push every 8 hours  sodium chloride 0.9%. 1000 milliLiter(s) (125 mL/Hr) IV Continuous <Continuous>    MEDICATIONS  (PRN):  acetaminophen   Tablet .. 650 milliGRAM(s) Oral every 6 hours PRN Moderate Pain (4 - 6)      No Known Allergies      LABS                        12.7   8.16  )-----------( 339      ( 2019 05:50 )             38.3     02-20    137  |  105  |  6<L>  ----------------------------<  79  3.5   |  21<L>  |  0.79    Ca    8.4      2019 05:36  Phos  3.0     02-  Mg     1.8     -    TPro  7.5  /  Alb  4.4  /  TBili  0.2  /  DBili  x   /  AST  10  /  ALT  17  /  AlkPhos  40        Urinalysis Basic - ( 2019 23:54 )    Color: Yellow / Appearance: Clear / S.020 / pH: x  Gluc: x / Ketone: 15 mg/dL  / Bili: Negative / Urobili: 0.2 E.U./dL   Blood: x / Protein: NEGATIVE mg/dL / Nitrite: NEGATIVE   Leuk Esterase: NEGATIVE / RBC: x / WBC x   Sq Epi: x / Non Sq Epi: x / Bacteria: x            IMAGING/EKG/ETC  EKG:  Xray:  CT:  MRI:

## 2019-02-22 PROBLEM — K51.919 ULCERATIVE COLITIS, UNSPECIFIED WITH UNSPECIFIED COMPLICATIONS: Chronic | Status: ACTIVE | Noted: 2019-02-20

## 2019-02-22 PROBLEM — A04.8 OTHER SPECIFIED BACTERIAL INTESTINAL INFECTIONS: Chronic | Status: ACTIVE | Noted: 2019-02-20

## 2019-02-25 ENCOUNTER — RX RENEWAL (OUTPATIENT)
Age: 24
End: 2019-02-25

## 2019-02-25 DIAGNOSIS — D62 ACUTE POSTHEMORRHAGIC ANEMIA: ICD-10-CM

## 2019-02-25 DIAGNOSIS — K51.90 ULCERATIVE COLITIS, UNSPECIFIED, WITHOUT COMPLICATIONS: ICD-10-CM

## 2019-02-25 LAB
GAMMA INTERFERON BACKGROUND BLD IA-ACNC: 0.03 IU/ML — SIGNIFICANT CHANGE UP
M TB IFN-G BLD-IMP: NEGATIVE — SIGNIFICANT CHANGE UP
M TB IFN-G CD4+ BCKGRND COR BLD-ACNC: -0.01 IU/ML — SIGNIFICANT CHANGE UP
M TB IFN-G CD4+CD8+ BCKGRND COR BLD-ACNC: -0.01 IU/ML — SIGNIFICANT CHANGE UP
QUANT TB PLUS MITOGEN MINUS NIL: 7.35 IU/ML — SIGNIFICANT CHANGE UP

## 2019-02-27 ENCOUNTER — APPOINTMENT (OUTPATIENT)
Dept: GASTROENTEROLOGY | Facility: CLINIC | Age: 24
End: 2019-02-27
Payer: COMMERCIAL

## 2019-02-27 VITALS
RESPIRATION RATE: 14 BRPM | WEIGHT: 111 LBS | TEMPERATURE: 97.8 F | DIASTOLIC BLOOD PRESSURE: 70 MMHG | OXYGEN SATURATION: 99 % | HEART RATE: 76 BPM | SYSTOLIC BLOOD PRESSURE: 100 MMHG

## 2019-02-27 PROCEDURE — 99214 OFFICE O/P EST MOD 30 MIN: CPT

## 2019-02-27 NOTE — PHYSICAL EXAM
[General Appearance - Alert] : alert [General Appearance - In No Acute Distress] : in no acute distress [Sclera] : the sclera and conjunctiva were normal [Examination Of The Oral Cavity] : the lips and gums were normal [Neck Appearance] : the appearance of the neck was normal [Abdomen Soft] : soft [Abdomen Tenderness] : non-tender [Cervical Lymph Nodes Enlarged Anterior Bilaterally] : anterior cervical [Supraclavicular Lymph Nodes Enlarged Bilaterally] : supraclavicular [No CVA Tenderness] : no ~M costovertebral angle tenderness [Abnormal Walk] : normal gait [] : no rash [Oriented To Time, Place, And Person] : oriented to person, place, and time [FreeTextEntry1] : mild b/l knee swelling, no tenderness

## 2019-02-27 NOTE — CONSULT LETTER
[Dear  ___] : Dear  [unfilled], [Courtesy Letter:] : I had the pleasure of seeing your patient, [unfilled], in my office today. [Please see my note below.] : Please see my note below. [Sincerely,] : Sincerely, [FreeTextEntry3] : Ming Durham MD\par Director, IBD Program\par [facility], [facility]\par \par Associate Professor of Medicine\par Marty Montejo\par [employer] [facility]\par

## 2019-02-27 NOTE — ASSESSMENT
[FreeTextEntry1] : 24 y/o F with UC, diagnosed in 8/2018 (extent was proximal TC on initial colonoscopy), complicated by recurrent C diff infection x 3, s/p therapy with vancomycin x 2, fidaxomicin x 1, with resolution of infection (C diff PCR is now negative). No response on 5ASA. Initial plan was to start VDZ, but it was denied by insurance. Furthermore, given ongoing severe symptoms she was admitted to the hospital and was started on IFX as it is faster acting that VDZ. She felt much better right after it, but has some ongoing symptoms. Suspect that given the burden of inflammation and more rapid drug clearance, she would require a higher dose of IFX. \par \par # Entensive UC\par - IFX at 10 mg/kg, next dose to be given as soon as possible. Discussed risks of IFX 10 mg/kg\par - continue pred now at 50 mg/day, can start taper once she feels better. Suspect b/l knee swelling is in the context of steroid use. Will monitor for now. \par A detailed discussion of the risks and benefits of biologic therapy occurred. We detailed increased risk of infections (bacterial, fungal, viral) which we will mitigate by immunizing in appropriate fashion (HBV,zoster, flu, pneumovax); risk of malignancy with time spent on lymphoma (HSTCL in young adults) and skin cancers (need for derm annual eval) ; risk of liver injury, bone marrow suppression, CNS disorders, allergic and infusion (if IV admin) reactions, idiosyncratic skin reactions, joint problems, among other rare and unusual manifestations.  We discussed the need to notify if fevers or major illness prior to infusions, and the need to maintain follow up and obtain request labs and evaluations.  In addition, we have already discusses resources such as [facility] and the manufacturers "patients" page to obtain additional detailed information on the medication.\par - RTC after next dose\par - Canasa suppository in the interim for urgency\par - discussed expectations, therapy efficacy, home infusions and various treatment options at length and all questions were answered\par - Vit D and Ca while on steroids\par - Celiac serologies negative \par \par #HCM\par - flu shot with PCP\par - Can discuss dexa at next visit once off steroids \par \par RTC 2 weeks after next dose\par \par Pt seen and d/w Dr Durham\par \par Violeta Perez MD, PGY7\par Advanced IBD Fellow\par

## 2019-02-27 NOTE — HISTORY OF PRESENT ILLNESS
[de-identified] : 22 y/o F with UC, diagnosed in 8/2018 (extent was proximal TC on initial colonoscopy), complicated by recurrent C diff infection x 3, s/p therapy with vancomycin x 2, fidaxomicin x 1, with resolution of infection (C diff PCR is now negative). 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 She continued to have bloody diarrhea and severe urgency 10-20 times/day, night time awakening. No EIM. Given severity of symptoms, she was admitted to the hospital and received IFX at 5 mg/kg with rapid, moderate improvement in symptoms. Her frequency of BMs in 6-10 times/day, including night-time awakening once. No blood. Some diarrhea. Depends on what she eats. Had severe cramping 2 days ago, increased prednisone from 40 to 50 mg and was also given hyoscyamine with improvement. \par Mild pain and swelling in knees b/l. No other EIM\par \par Initial Hx:\par 23 F with new onset symptoms of bloody diarrhea, diagnosed with UC, sent by primary GI, Dr Martinez for further management. \par Pt says 6 weeks ago, she started having several episodes of bloody diarrhea a day with abd pain and significant weight loss. She went to her pediatrician who diagnosed her with C diff (test positive per pt) and started her on flayl with no improvement. She was referred to Dr Martinez, repeat C diff test (-) and stool studies (-). She underwent flex sig 8/15/18 with showed moderate active colitis from the anal verge to the distal TC (extent of exam) with biopsies consistent with chronic active colitis. She was started on vanco PO and prednisone 40 mg, has been on latter for 2 weeks now with significant improvement and feels 95% better. She now goes 2-5 times/day, scant blood, minimal pain. No fatigue, but unable to sleep d/t pred and had acid reflux. \par \par She has had a "sensitive stomach" for as long as she can remember- foods such as daily, oily or spicy foods give her diarrhea. \par \par Endoscopy: flex sig 8/15/18 with showed moderate active colitis from the anal verge to the distal TC (extent of exam) \par Pathology: chronic active colitis\par \par SH: , two young children, no smoking/ EtOH\par FH: no h/o IBD\par Allergies: NKDA

## 2019-03-01 NOTE — HISTORY OF PRESENT ILLNESS
[de-identified] : 22 y/o F with UC, diagnosed in 8/2018 (extent was proximal TC on initial colonoscopy), complicated by recurrent C diff infection x 3, s/p therapy with vancomycin x 2, fidaxomicin x 1, with resolution of infection (C diff PCR is now negative). 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 She continued to have bloody diarrhea and severe urgency 10-20 times/day, night time awakening. No EIM. Given severity of symptoms, she was admitted to the hospital and received IFX at 5 mg/kg with rapid, moderate improvement in symptoms. Her frequency of BMs in 6-10 times/day, including night-time awakening once. No blood. Some diarrhea. Depends on what she eats. Had severe cramping 2 days ago, increased prednisone from 40 to 50 mg and was also given hyoscyamine with improvement. \par Mild pain and swelling in knees b/l. No other EIM\par \par Initial Hx:\par 23 F with new onset symptoms of bloody diarrhea, diagnosed with UC, sent by primary GI, Dr Martinez for further management. \par Pt says 6 weeks ago, she started having several episodes of bloody diarrhea a day with abd pain and significant weight loss. She went to her pediatrician who diagnosed her with C diff (test positive per pt) and started her on flayl with no improvement. She was referred to Dr Martinez, repeat C diff test (-) and stool studies (-). She underwent flex sig 8/15/18 with showed moderate active colitis from the anal verge to the distal TC (extent of exam) with biopsies consistent with chronic active colitis. She was started on vanco PO and prednisone 40 mg, has been on latter for 2 weeks now with significant improvement and feels 95% better. She now goes 2-5 times/day, scant blood, minimal pain. No fatigue, but unable to sleep d/t pred and had acid reflux. \par \par She has had a "sensitive stomach" for as long as she can remember- foods such as daily, oily or spicy foods give her diarrhea. \par \par Endoscopy: flex sig 8/15/18 with showed moderate active colitis from the anal verge to the distal TC (extent of exam) \par Pathology: chronic active colitis\par \par SH: , two young children, no smoking/ EtOH\par FH: no h/o IBD\par Allergies: NKDA

## 2019-03-01 NOTE — PHYSICAL EXAM
[General Appearance - Alert] : alert [General Appearance - In No Acute Distress] : in no acute distress [Sclera] : the sclera and conjunctiva were normal [Examination Of The Oral Cavity] : the lips and gums were normal [Neck Appearance] : the appearance of the neck was normal [Abdomen Soft] : soft [Abdomen Tenderness] : non-tender [No CVA Tenderness] : no ~M costovertebral angle tenderness [Abnormal Walk] : normal gait [] : no rash [Oriented To Time, Place, And Person] : oriented to person, place, and time [Cervical Lymph Nodes Enlarged Anterior Bilaterally] : anterior cervical [Supraclavicular Lymph Nodes Enlarged Bilaterally] : supraclavicular [FreeTextEntry1] : mild b/l knee swelling, no tenderness

## 2019-03-01 NOTE — DISCUSSION/SUMMARY
[FreeTextEntry1] : returned message from the answering service\par Pt reports that she is having a "severe flare"- she is having up to 20 bloody BMs a day, associated with abd cramping. No fevers, chills.Pt called answering service yesterday and at that time it was recommended that she go to the ER but she did not go. Today, I also instructed her that she should go to the ER ( so that she can be examined and received IV medications if needed). pt reports that she will think about it. i told her that if she does not go to ER tonight, she should at least come see us in clinic tomorrow morning.\par Pt reports that she will think about it and get back to me.\par I also discussed case with Dr. Perez.

## 2019-03-01 NOTE — CONSULT LETTER
[Dear  ___] : Dear  [unfilled], [Courtesy Letter:] : I had the pleasure of seeing your patient, [unfilled], in my office today. [Please see my note below.] : Please see my note below. [Sincerely,] : Sincerely, [FreeTextEntry3] : Ming Durham MD\par Director, IBD Program\par [facility], [facility]\par \par Associate Professor of Medicine\par Marty Montejo\par [employer] at [facility]\par

## 2019-03-01 NOTE — ASSESSMENT
[FreeTextEntry1] : 22 y/o F with UC, diagnosed in 8/2018 (extent was proximal TC on initial colonoscopy), complicated by recurrent C diff infection x 3, s/p therapy with vancomycin x 2, fidaxomicin x 1, with resolution of infection (C diff PCR is now negative). No response on 5ASA. Initial plan was to start VDZ, but it was denied by insurance. Furthermore, given ongoing severe symptoms she was admitted to the hospital and was started on IFX as it is faster acting that VDZ. She felt much better right after it, but has some ongoing symptoms. Suspect that given the burden of inflammation and more rapid drug clearance, she would require a higher dose of IFX. \par \par # Entensive UC\par - IFX at 10 mg/kg, next dose to be given as soon as possible. Discussed risks of IFX 10 mg/kg\par - continue pred now at 50 mg/day, can start taper once she feels better. Suspect b/l knee swelling is in the context of steroid use. Will monitor for now. \par A detailed discussion of the risks and benefits of biologic therapy occurred. We detailed increased risk of infections (bacterial, fungal, viral) which we will mitigate by immunizing in appropriate fashion (HBV,zoster, flu, pneumovax); risk of malignancy with time spent on lymphoma (HSTCL in young adults) and skin cancers (need for derm annual eval) ; risk of liver injury, bone marrow suppression, CNS disorders, allergic and infusion (if IV admin) reactions, idiosyncratic skin reactions, joint problems, among other rare and unusual manifestations.  We discussed the need to notify if fevers or major illness prior to infusions, and the need to maintain follow up and obtain request labs and evaluations.  In addition, we have already discusses resources such as [facility] and the manufacturers "patients" page to obtain additional detailed information on the medication.\par - RTC after next dose\par - Canasa suppository in the interim for urgency\par - discussed expectations, therapy efficacy, home infusions and various treatment options at length and all questions were answered\par - Vit D and Ca while on steroids\par - Celiac serologies negative \par \par #HCM\par - flu shot with PCP\par - Can discuss dexa at next visit once off steroids \par \par RTC 2 weeks after next dose\par \par Pt seen and d/w Dr Durham\par \par Violeta Perez MD, PGY7\par Advanced IBD Fellow\par

## 2019-03-05 ENCOUNTER — OUTPATIENT (OUTPATIENT)
Dept: OUTPATIENT SERVICES | Facility: HOSPITAL | Age: 24
LOS: 1 days | End: 2019-03-05
Payer: COMMERCIAL

## 2019-03-05 ENCOUNTER — APPOINTMENT (OUTPATIENT)
Dept: INFUSION THERAPY | Facility: HOSPITAL | Age: 24
End: 2019-03-05

## 2019-03-05 DIAGNOSIS — K46.9 UNSPECIFIED ABDOMINAL HERNIA WITHOUT OBSTRUCTION OR GANGRENE: Chronic | ICD-10-CM

## 2019-03-05 DIAGNOSIS — K51.90 ULCERATIVE COLITIS, UNSPECIFIED, WITHOUT COMPLICATIONS: ICD-10-CM

## 2019-03-05 LAB
ALBUMIN SERPL ELPH-MCNC: 4.5 G/DL — SIGNIFICANT CHANGE UP (ref 3.3–5)
ALP SERPL-CCNC: 35 U/L — LOW (ref 40–120)
ALT FLD-CCNC: 24 U/L — SIGNIFICANT CHANGE UP (ref 10–45)
ANION GAP SERPL CALC-SCNC: 12 MMOL/L — SIGNIFICANT CHANGE UP (ref 5–17)
AST SERPL-CCNC: 13 U/L — SIGNIFICANT CHANGE UP (ref 10–40)
BILIRUB DIRECT SERPL-MCNC: <0.2 MG/DL — SIGNIFICANT CHANGE UP (ref 0–0.2)
BILIRUB INDIRECT FLD-MCNC: >0 MG/DL — LOW (ref 0.2–1)
BILIRUB SERPL-MCNC: 0.2 MG/DL — SIGNIFICANT CHANGE UP (ref 0.2–1.2)
BUN SERPL-MCNC: 10 MG/DL — SIGNIFICANT CHANGE UP (ref 7–23)
CALCIUM SERPL-MCNC: 10.1 MG/DL — SIGNIFICANT CHANGE UP (ref 8.4–10.5)
CHLORIDE SERPL-SCNC: 100 MMOL/L — SIGNIFICANT CHANGE UP (ref 96–108)
CO2 SERPL-SCNC: 27 MMOL/L — SIGNIFICANT CHANGE UP (ref 22–31)
CREAT SERPL-MCNC: 0.72 MG/DL — SIGNIFICANT CHANGE UP (ref 0.5–1.3)
CRP SERPL-MCNC: 0.06 MG/DL — SIGNIFICANT CHANGE UP (ref 0–0.4)
GLUCOSE SERPL-MCNC: 126 MG/DL — HIGH (ref 70–99)
HCT VFR BLD CALC: 40 % — SIGNIFICANT CHANGE UP (ref 34.5–45)
HGB BLD-MCNC: 13.2 G/DL — SIGNIFICANT CHANGE UP (ref 11.5–15.5)
MCHC RBC-ENTMCNC: 31.1 PG — SIGNIFICANT CHANGE UP (ref 27–34)
MCHC RBC-ENTMCNC: 33 GM/DL — SIGNIFICANT CHANGE UP (ref 32–36)
MCV RBC AUTO: 94.3 FL — SIGNIFICANT CHANGE UP (ref 80–100)
NRBC # BLD: 0 /100 WBCS — SIGNIFICANT CHANGE UP (ref 0–0)
PLATELET # BLD AUTO: 322 K/UL — SIGNIFICANT CHANGE UP (ref 150–400)
POTASSIUM SERPL-MCNC: 5.3 MMOL/L — SIGNIFICANT CHANGE UP (ref 3.5–5.3)
POTASSIUM SERPL-SCNC: 5.3 MMOL/L — SIGNIFICANT CHANGE UP (ref 3.5–5.3)
PROT SERPL-MCNC: 7.2 G/DL — SIGNIFICANT CHANGE UP (ref 6–8.3)
RBC # BLD: 4.24 M/UL — SIGNIFICANT CHANGE UP (ref 3.8–5.2)
RBC # FLD: 12 % — SIGNIFICANT CHANGE UP (ref 10.3–14.5)
SODIUM SERPL-SCNC: 139 MMOL/L — SIGNIFICANT CHANGE UP (ref 135–145)
WBC # BLD: 9.18 K/UL — SIGNIFICANT CHANGE UP (ref 3.8–10.5)
WBC # FLD AUTO: 9.18 K/UL — SIGNIFICANT CHANGE UP (ref 3.8–10.5)

## 2019-03-05 PROCEDURE — 96375 TX/PRO/DX INJ NEW DRUG ADDON: CPT

## 2019-03-05 PROCEDURE — 86140 C-REACTIVE PROTEIN: CPT

## 2019-03-05 PROCEDURE — 80048 BASIC METABOLIC PNL TOTAL CA: CPT

## 2019-03-05 PROCEDURE — 85027 COMPLETE CBC AUTOMATED: CPT

## 2019-03-05 PROCEDURE — 96413 CHEMO IV INFUSION 1 HR: CPT

## 2019-03-05 PROCEDURE — 96415 CHEMO IV INFUSION ADDL HR: CPT

## 2019-03-05 PROCEDURE — 80076 HEPATIC FUNCTION PANEL: CPT

## 2019-03-05 PROCEDURE — 36415 COLL VENOUS BLD VENIPUNCTURE: CPT

## 2019-03-05 RX ORDER — DIPHENHYDRAMINE HCL 50 MG
50 CAPSULE ORAL ONCE
Qty: 0 | Refills: 0 | Status: COMPLETED | OUTPATIENT
Start: 2019-03-05 | End: 2019-03-05

## 2019-03-05 RX ORDER — INFLIXIMAB-DYYB 120 MG/ML
600 INJECTION SUBCUTANEOUS ONCE
Qty: 0 | Refills: 0 | Status: COMPLETED | OUTPATIENT
Start: 2019-03-05 | End: 2019-03-05

## 2019-03-05 RX ORDER — ACETAMINOPHEN 500 MG
650 TABLET ORAL ONCE
Qty: 0 | Refills: 0 | Status: COMPLETED | OUTPATIENT
Start: 2019-03-05 | End: 2019-03-05

## 2019-03-05 RX ADMIN — Medication 650 MILLIGRAM(S): at 12:58

## 2019-03-05 RX ADMIN — INFLIXIMAB-DYYB 600 MILLIGRAM(S): 120 INJECTION SUBCUTANEOUS at 14:36

## 2019-03-05 RX ADMIN — Medication 650 MILLIGRAM(S): at 12:09

## 2019-03-05 RX ADMIN — Medication 50 MILLIGRAM(S): at 12:36

## 2019-03-05 RX ADMIN — INFLIXIMAB-DYYB 155 MILLIGRAM(S): 120 INJECTION SUBCUTANEOUS at 12:36

## 2019-03-05 RX ADMIN — Medication 204 MILLIGRAM(S): at 12:21

## 2019-03-06 DIAGNOSIS — R11.2 NAUSEA WITH VOMITING, UNSPECIFIED: ICD-10-CM

## 2019-03-12 ENCOUNTER — RX RENEWAL (OUTPATIENT)
Age: 24
End: 2019-03-12

## 2019-03-18 ENCOUNTER — APPOINTMENT (OUTPATIENT)
Dept: GASTROENTEROLOGY | Facility: CLINIC | Age: 24
End: 2019-03-18
Payer: COMMERCIAL

## 2019-03-18 VITALS
HEIGHT: 63 IN | RESPIRATION RATE: 14 BRPM | SYSTOLIC BLOOD PRESSURE: 100 MMHG | WEIGHT: 114 LBS | OXYGEN SATURATION: 98 % | DIASTOLIC BLOOD PRESSURE: 60 MMHG | BODY MASS INDEX: 20.2 KG/M2 | HEART RATE: 73 BPM

## 2019-03-18 PROCEDURE — 99214 OFFICE O/P EST MOD 30 MIN: CPT

## 2019-03-18 NOTE — HISTORY OF PRESENT ILLNESS
[de-identified] : 23 F with UC, diagnosed in 8/2018 (extent was proximal TC on initial colonoscopy), complicated by recurrent C diff infection x 3, s/p therapy with vancomycin x 2, fidaxomicin x 1, with resolution of infection (C diff PCR is now negative). 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 She continued to have bloody diarrhea and severe urgency 10-20 times/day, night time awakening. No EIM. Given severity of symptoms, she was admitted to the hospital and received IFX at 5 mg/kg with rapid, moderate improvement in symptoms.However, she continued to have symptoms at last visit, requiring pred to be increased to 50 mg, and hyoscyamine for cramps. Dose was increased to 10 mg/kg. She received it on 3/5/18. \par \par Today she feels great!! BMs 1-2 times/day, formed, no blood. Wakes up at 5-6 am to defecate. Mild urgency. Continues to have slight abd discomfort, overall improving. No knee pain, no other EIM. \par Has some calf cramping, mood swings and acne. Tapering off pred, on 35 today. \par \par Initial Hx:\par 23 F with new onset symptoms of bloody diarrhea, diagnosed with UC, sent by primary GI, Dr Martinez for further management. \par Pt says 6 weeks ago, she started having several episodes of bloody diarrhea a day with abd pain and significant weight loss. She went to her pediatrician who diagnosed her with C diff (test positive per pt) and started her on flayl with no improvement. She was referred to Dr Martinez, repeat C diff test (-) and stool studies (-). She underwent flex sig 8/15/18 with showed moderate active colitis from the anal verge to the distal TC (extent of exam) with biopsies consistent with chronic active colitis. She was started on vanco PO and prednisone 40 mg, has been on latter for 2 weeks now with significant improvement and feels 95% better. She now goes 2-5 times/day, scant blood, minimal pain. No fatigue, but unable to sleep d/t pred and had acid reflux. \par \par She has had a "sensitive stomach" for as long as she can remember- foods such as daily, oily or spicy foods give her diarrhea. \par \par Endoscopy: flex sig 8/15/18 with showed moderate active colitis from the anal verge to the distal TC (extent of exam) \par Pathology: chronic active colitis\par \par SH: , two young children, no smoking/ EtOH\par FH: no h/o IBD\par Allergies: NKDA

## 2019-03-18 NOTE — CONSULT LETTER
[Dear  ___] : Dear  [unfilled], [Courtesy Letter:] : I had the pleasure of seeing your patient, [unfilled], in my office today. [Please see my note below.] : Please see my note below. [Sincerely,] : Sincerely, [FreeTextEntry3] : Ming Durham MD\par Director, IBD Program\par [facility], [facility]\par \par Associate Professor of Medicine\par Marty Montejo\par [employer] at [facility]\par

## 2019-03-18 NOTE — ASSESSMENT
[FreeTextEntry1] : 22 y/o F with UC, diagnosed in 8/2018 (extent was proximal TC on initial colonoscopy), complicated by recurrent C diff infection x 3, s/p therapy with vancomycin x 2, fidaxomicin x 1, with resolution of infection (C diff PCR is now negative). No response on 5ASA. Initial plan was to start VDZ, but it was denied by insurance. Furthermore, given ongoing severe symptoms steroid resistant she was admitted to the hospital and was started on IFX. She felt much better right after it, but has some ongoing symptoms. Given the burden of inflammation and more rapid drug clearance, she was given 10 mg/kg as next dose. Today she feels in remission and is tapering off prednisone. CRP now normal, anemia resolved. \par \par # Entensive UC\par - IFX at 10 mg/kg, next dose in 2 weeks, followed by maintenance at q8 weeks. Will check drug and Ab level once in maintenance. Pt wants to know when the dose can be reduced. Explained to her that she should remain on the higher dose for now, can reconsider after checking drug levels\par - continue pred taper, 5 mg every 5 days. Would expect steroid-induced AEs to resolve once tapered off. (yoly steroid acne)\par - plan for colonoscopy in 3-6 mos \par - Vit D and Ca while on steroids\par - Celiac serologies negative \par - did discuss timing and safety in pregnany, ideally after she's w/d from steroids and on stable ifx dose and we've confirmed mucosal healing.\par \par #HCM\par - flu shot with PCP\par - pt to confirm vaccinations with primary physician and bring records at next visit\par - Can discuss dexa at next visit once off steroids \par \par RTC in 8 weeks\par \par Pt seen and d/w Dr Durham\par \par Violeta Perez MD, PGY7\par Advanced IBD Fellow\par

## 2019-03-18 NOTE — PHYSICAL EXAM
[General Appearance - Alert] : alert [General Appearance - In No Acute Distress] : in no acute distress [Sclera] : the sclera and conjunctiva were normal [Examination Of The Oral Cavity] : the lips and gums were normal [Neck Appearance] : the appearance of the neck was normal [] : no respiratory distress [Edema] : there was no peripheral edema [Abdomen Soft] : soft [Abdomen Tenderness] : non-tender [Cervical Lymph Nodes Enlarged Anterior Bilaterally] : anterior cervical [No CVA Tenderness] : no ~M costovertebral angle tenderness [Abnormal Walk] : normal gait [Musculoskeletal - Swelling] : no joint swelling seen [Oriented To Time, Place, And Person] : oriented to person, place, and time [FreeTextEntry1] : acneform lesions on face

## 2019-04-01 ENCOUNTER — OUTPATIENT (OUTPATIENT)
Dept: OUTPATIENT SERVICES | Facility: HOSPITAL | Age: 24
LOS: 1 days | End: 2019-04-01
Payer: COMMERCIAL

## 2019-04-01 ENCOUNTER — APPOINTMENT (OUTPATIENT)
Dept: INFUSION THERAPY | Facility: HOSPITAL | Age: 24
End: 2019-04-01

## 2019-04-01 VITALS
HEART RATE: 83 BPM | OXYGEN SATURATION: 98 % | RESPIRATION RATE: 18 BRPM | DIASTOLIC BLOOD PRESSURE: 74 MMHG | SYSTOLIC BLOOD PRESSURE: 115 MMHG | TEMPERATURE: 99 F

## 2019-04-01 VITALS
SYSTOLIC BLOOD PRESSURE: 103 MMHG | HEIGHT: 63 IN | WEIGHT: 117.95 LBS | OXYGEN SATURATION: 99 % | RESPIRATION RATE: 18 BRPM | DIASTOLIC BLOOD PRESSURE: 60 MMHG | HEART RATE: 74 BPM | TEMPERATURE: 99 F

## 2019-04-01 DIAGNOSIS — K51.90 ULCERATIVE COLITIS, UNSPECIFIED, WITHOUT COMPLICATIONS: ICD-10-CM

## 2019-04-01 DIAGNOSIS — K46.9 UNSPECIFIED ABDOMINAL HERNIA WITHOUT OBSTRUCTION OR GANGRENE: Chronic | ICD-10-CM

## 2019-04-01 LAB
ALBUMIN SERPL ELPH-MCNC: 4.5 G/DL — SIGNIFICANT CHANGE UP (ref 3.3–5)
ALP SERPL-CCNC: 28 U/L — LOW (ref 40–120)
ALT FLD-CCNC: 20 U/L — SIGNIFICANT CHANGE UP (ref 10–45)
ANION GAP SERPL CALC-SCNC: 14 MMOL/L — SIGNIFICANT CHANGE UP (ref 5–17)
AST SERPL-CCNC: 16 U/L — SIGNIFICANT CHANGE UP (ref 10–40)
BILIRUB DIRECT SERPL-MCNC: <0.2 MG/DL — SIGNIFICANT CHANGE UP (ref 0–0.2)
BILIRUB INDIRECT FLD-MCNC: SIGNIFICANT CHANGE UP MG/DL (ref 0.2–1)
BILIRUB SERPL-MCNC: 0.2 MG/DL — SIGNIFICANT CHANGE UP (ref 0.2–1.2)
BUN SERPL-MCNC: 13 MG/DL — SIGNIFICANT CHANGE UP (ref 7–23)
CALCIUM SERPL-MCNC: 9.7 MG/DL — SIGNIFICANT CHANGE UP (ref 8.4–10.5)
CHLORIDE SERPL-SCNC: 100 MMOL/L — SIGNIFICANT CHANGE UP (ref 96–108)
CO2 SERPL-SCNC: 27 MMOL/L — SIGNIFICANT CHANGE UP (ref 22–31)
CREAT SERPL-MCNC: 0.76 MG/DL — SIGNIFICANT CHANGE UP (ref 0.5–1.3)
CRP SERPL-MCNC: 1.92 MG/DL — HIGH (ref 0–0.4)
GLUCOSE SERPL-MCNC: 121 MG/DL — HIGH (ref 70–99)
HCT VFR BLD CALC: 43.4 % — SIGNIFICANT CHANGE UP (ref 34.5–45)
HGB BLD-MCNC: 14 G/DL — SIGNIFICANT CHANGE UP (ref 11.5–15.5)
MCHC RBC-ENTMCNC: 30.9 PG — SIGNIFICANT CHANGE UP (ref 27–34)
MCHC RBC-ENTMCNC: 32.3 GM/DL — SIGNIFICANT CHANGE UP (ref 32–36)
MCV RBC AUTO: 95.8 FL — SIGNIFICANT CHANGE UP (ref 80–100)
NRBC # BLD: 0 /100 WBCS — SIGNIFICANT CHANGE UP (ref 0–0)
PLATELET # BLD AUTO: 247 K/UL — SIGNIFICANT CHANGE UP (ref 150–400)
POTASSIUM SERPL-MCNC: 3.6 MMOL/L — SIGNIFICANT CHANGE UP (ref 3.5–5.3)
POTASSIUM SERPL-SCNC: 3.6 MMOL/L — SIGNIFICANT CHANGE UP (ref 3.5–5.3)
PROT SERPL-MCNC: 7.5 G/DL — SIGNIFICANT CHANGE UP (ref 6–8.3)
RBC # BLD: 4.53 M/UL — SIGNIFICANT CHANGE UP (ref 3.8–5.2)
RBC # FLD: 12.3 % — SIGNIFICANT CHANGE UP (ref 10.3–14.5)
SODIUM SERPL-SCNC: 141 MMOL/L — SIGNIFICANT CHANGE UP (ref 135–145)
WBC # BLD: 8.16 K/UL — SIGNIFICANT CHANGE UP (ref 3.8–10.5)
WBC # FLD AUTO: 8.16 K/UL — SIGNIFICANT CHANGE UP (ref 3.8–10.5)

## 2019-04-01 PROCEDURE — 96415 CHEMO IV INFUSION ADDL HR: CPT

## 2019-04-01 PROCEDURE — 85027 COMPLETE CBC AUTOMATED: CPT

## 2019-04-01 PROCEDURE — 36415 COLL VENOUS BLD VENIPUNCTURE: CPT

## 2019-04-01 PROCEDURE — 80048 BASIC METABOLIC PNL TOTAL CA: CPT

## 2019-04-01 PROCEDURE — 96375 TX/PRO/DX INJ NEW DRUG ADDON: CPT

## 2019-04-01 PROCEDURE — 86140 C-REACTIVE PROTEIN: CPT

## 2019-04-01 PROCEDURE — 96413 CHEMO IV INFUSION 1 HR: CPT

## 2019-04-01 PROCEDURE — 80076 HEPATIC FUNCTION PANEL: CPT

## 2019-04-01 RX ORDER — DIPHENHYDRAMINE HCL 50 MG
50 CAPSULE ORAL ONCE
Qty: 0 | Refills: 0 | Status: COMPLETED | OUTPATIENT
Start: 2019-04-01 | End: 2019-04-01

## 2019-04-01 RX ORDER — ACETAMINOPHEN 500 MG
650 TABLET ORAL ONCE
Qty: 0 | Refills: 0 | Status: COMPLETED | OUTPATIENT
Start: 2019-04-01 | End: 2019-04-01

## 2019-04-01 RX ORDER — INFLIXIMAB-DYYB 120 MG/ML
600 INJECTION SUBCUTANEOUS ONCE
Qty: 0 | Refills: 0 | Status: COMPLETED | OUTPATIENT
Start: 2019-04-01 | End: 2019-04-01

## 2019-04-01 RX ADMIN — INFLIXIMAB-DYYB 600 MILLIGRAM(S): 120 INJECTION SUBCUTANEOUS at 15:22

## 2019-04-01 RX ADMIN — Medication 650 MILLIGRAM(S): at 13:05

## 2019-04-01 RX ADMIN — INFLIXIMAB-DYYB 155 MILLIGRAM(S): 120 INJECTION SUBCUTANEOUS at 13:22

## 2019-04-01 RX ADMIN — Medication 204 MILLIGRAM(S): at 13:05

## 2019-04-01 RX ADMIN — Medication 50 MILLIGRAM(S): at 13:20

## 2019-04-02 DIAGNOSIS — R11.2 NAUSEA WITH VOMITING, UNSPECIFIED: ICD-10-CM

## 2019-04-07 LAB
INFLIXIMAB AB SERPL-MCNC: <22 NG/ML — SIGNIFICANT CHANGE UP
INFLIXIMAB SERPL-MCNC: 20 UG/ML — SIGNIFICANT CHANGE UP

## 2019-04-12 ENCOUNTER — RX RENEWAL (OUTPATIENT)
Age: 24
End: 2019-04-12

## 2019-04-15 ENCOUNTER — OTHER (OUTPATIENT)
Age: 24
End: 2019-04-15

## 2019-04-18 ENCOUNTER — RX RENEWAL (OUTPATIENT)
Age: 24
End: 2019-04-18

## 2019-05-01 ENCOUNTER — APPOINTMENT (OUTPATIENT)
Dept: GASTROENTEROLOGY | Facility: CLINIC | Age: 24
End: 2019-05-01
Payer: COMMERCIAL

## 2019-05-01 VITALS
RESPIRATION RATE: 14 BRPM | WEIGHT: 115 LBS | HEIGHT: 63 IN | SYSTOLIC BLOOD PRESSURE: 110 MMHG | DIASTOLIC BLOOD PRESSURE: 70 MMHG | HEART RATE: 88 BPM | OXYGEN SATURATION: 98 % | BODY MASS INDEX: 20.38 KG/M2

## 2019-05-01 PROCEDURE — 36415 COLL VENOUS BLD VENIPUNCTURE: CPT

## 2019-05-01 PROCEDURE — 99214 OFFICE O/P EST MOD 30 MIN: CPT | Mod: 25

## 2019-05-01 RX ORDER — NORGESTREL AND ETHINYL ESTRADIOL 0.3-0.03MG
0.3-3 KIT ORAL
Qty: 28 | Refills: 0 | Status: DISCONTINUED | COMMUNITY
Start: 2018-07-16 | End: 2019-05-01

## 2019-05-01 RX ORDER — MESALAMINE 375 MG/1
0.38 CAPSULE, EXTENDED RELEASE ORAL DAILY
Qty: 360 | Refills: 2 | Status: DISCONTINUED | COMMUNITY
Start: 2018-08-29 | End: 2019-05-01

## 2019-05-01 RX ORDER — POLYETHYLENE GLYCOL 3350 17 G/17G
17 POWDER, FOR SOLUTION ORAL
Qty: 1 | Refills: 2 | Status: DISCONTINUED | COMMUNITY
Start: 2019-01-11 | End: 2019-05-01

## 2019-05-01 RX ORDER — MESALAMINE 1.2 G/1
TABLET, DELAYED RELEASE ORAL
Refills: 0 | Status: DISCONTINUED | COMMUNITY
End: 2019-05-01

## 2019-05-01 RX ORDER — LIDOCAINE 50 MG/G
5 CREAM TOPICAL
Qty: 1 | Refills: 2 | Status: DISCONTINUED | COMMUNITY
Start: 2018-11-26 | End: 2019-05-01

## 2019-05-01 RX ORDER — MESALAMINE 1000 MG/1
1000 SUPPOSITORY RECTAL
Qty: 30 | Refills: 0 | Status: DISCONTINUED | COMMUNITY
Start: 2019-02-11 | End: 2019-05-01

## 2019-05-01 RX ORDER — METHYLCELLULOSE 2 G/10.2G
POWDER, FOR SOLUTION ORAL
Qty: 1 | Refills: 0 | Status: DISCONTINUED | COMMUNITY
Start: 2019-01-11 | End: 2019-05-01

## 2019-05-01 RX ORDER — MESALAMINE 4 G/60ML
4 SUSPENSION RECTAL
Qty: 4 | Refills: 1 | Status: DISCONTINUED | COMMUNITY
Start: 2019-02-07 | End: 2019-05-01

## 2019-05-01 NOTE — HISTORY OF PRESENT ILLNESS
[de-identified] : 23 Y F with UC, diagnosed in 8/2018 (extent was proximal TC on initial colonoscopy), complicated by recurrent C diff infection x 3, s/p therapy with vancomycin x 2, fidaxomicin x 1, with resolution of infection (C diff PCR is now negative). Presents today 85-90% overall improved on monotherapy IFX 10mg/kg, s/p 3 loading doses, remains on prednisone taper (now on 12.5 mg), with some symptoms concerning for IBS overlap. \par \par Patient reports since last visit she's overall improved. Slowly tapering off prednisone by 2.5mg per week with no recurrence of severe symptoms. Abdominal cramping, urgency and loose stools on occasion r/t stress and sleep deprivation but overall having 2-3 stools BSC 5-6 that are nonbloody daily. No nocturnal symptoms. No fevers/chills or nausea/vomiting.\par No EIMs. \par \par Most recent fecal calprotectin 59, however CRP 1.9. No recent URI or other complaints. \par \par \par \par Initial Hx:\par 23 F with new onset symptoms of bloody diarrhea, diagnosed with UC, sent by primary GI, Dr Martinez for further management. \par Pt says 6 weeks ago, she started having several episodes of bloody diarrhea a day with abd pain and significant weight loss. She went to her pediatrician who diagnosed her with C diff (test positive per pt) and started her on flayl with no improvement. She was referred to Dr Martinez, repeat C diff test (-) and stool studies (-). She underwent flex sig 8/15/18 with showed moderate active colitis from the anal verge to the distal TC (extent of exam) with biopsies consistent with chronic active colitis. She was started on vanco PO and prednisone 40 mg, has been on latter for 2 weeks now with significant improvement and feels 95% better. She now goes 2-5 times/day, scant blood, minimal pain. No fatigue, but unable to sleep d/t pred and had acid reflux. \par \par She has had a "sensitive stomach" for as long as she can remember- foods such as daily, oily or spicy foods give her diarrhea as well as stress and sleep deprivation. \par \par Endoscopy: flex sig 8/15/18 with showed moderate active colitis from the anal verge to the distal TC (extent of exam) \par Pathology: chronic active colitis\par \par SH: , two young children, no smoking/ EtOH\par FH: no h/o IBD\par Allergies: NKDA

## 2019-05-01 NOTE — ASSESSMENT
[FreeTextEntry1] : 22 y/o F with UC, diagnosed in 8/2018 (extent was proximal TC on initial colonoscopy), complicated by recurrent C diff infection x 3, s/p therapy with vancomycin x 2, fidaxomicin x 1, with resolution of infection (C diff PCR is now negative). No response on 5ASA. Initial plan was to start VDZ, but it was denied by insurance. Furthermore, given ongoing severe symptoms steroid resistance she was admitted to the hospital and was started on IFX. She is now on monotherapy IFX 10mg/kg on steroid taper feeling overall 85-90% improved on Remicade, however does c/o intermittent symptoms concerning for  (post infectious) IBS overlap. \par \par # Entensive UC\par - cont IFX at 10 mg/kg Q 8 weeks, next dose May 28; IFX level 20 with no antibodies\par - check TPMT enzyme proactively if concern for antibody development/loss of response in the future \par - cont prednisone taper 2.5-5mg per week to off as tolerates; cont Ca/Vit D while on pred\par - plan for flex sig in June to evaluate for mucosal healing\par - did discuss timing and safety in pregnancy, ideally after she's w/d from steroids and on stable ifx dose with proven mucosal remission \par - discussed best timing of pregnancy after confirming mucosal healing this summer.\par \par IBS overlap\par - consider desipramine after flex sig if in endoscopic remission \par \par #HCM\par - flu shot with PCP\par - low titer to Hep B; repeat with PCP \par - immune to MMR \par - immune to varicella \par - B12 level 372 \par - Vit D 41.9 \par - iron panel NML \par - can discuss dexa at next visit once off steroids \par - f/b OB for papsmears and oral birth control \par - cont to avoid tobacco use and NSAID use \par \par RTC post-procedure \par \par Pt seen and d/w Dr Durham\par \par Violeta Perez MD, PGY7\par Advanced IBD Fellow\par

## 2019-05-01 NOTE — PHYSICAL EXAM
[General Appearance - Alert] : alert [General Appearance - In No Acute Distress] : in no acute distress [Sclera] : the sclera and conjunctiva were normal [Examination Of The Oral Cavity] : the lips and gums were normal [Neck Appearance] : the appearance of the neck was normal [] : no respiratory distress [Abdomen Soft] : soft [Edema] : there was no peripheral edema [Abdomen Tenderness] : non-tender [No CVA Tenderness] : no ~M costovertebral angle tenderness [Cervical Lymph Nodes Enlarged Anterior Bilaterally] : anterior cervical [Abnormal Walk] : normal gait [Musculoskeletal - Swelling] : no joint swelling seen [Oriented To Time, Place, And Person] : oriented to person, place, and time [FreeTextEntry1] : acneform lesions on face

## 2019-05-01 NOTE — CONSULT LETTER
[Dear  ___] : Dear  [unfilled], [Courtesy Letter:] : I had the pleasure of seeing your patient, [unfilled], in my office today. [Please see my note below.] : Please see my note below. [Sincerely,] : Sincerely, [FreeTextEntry3] : Ming Durham MD\par Director, IBD Program\par [facility], [facility]\par \par Associate Professor of Medicine\par [employer]\par [employer] [facility]\par

## 2019-05-02 LAB — CRP SERPL-MCNC: <0.1 MG/DL

## 2019-05-07 LAB
TPMT ENZYME INTERPRETATION: NORMAL
TPMT ENZYME METHODOLOGY: NORMAL
TPMT ENZYME: 15.7

## 2019-05-28 ENCOUNTER — APPOINTMENT (OUTPATIENT)
Dept: INFUSION THERAPY | Facility: HOSPITAL | Age: 24
End: 2019-05-28

## 2019-05-28 ENCOUNTER — OUTPATIENT (OUTPATIENT)
Dept: OUTPATIENT SERVICES | Facility: HOSPITAL | Age: 24
LOS: 1 days | End: 2019-05-28
Payer: COMMERCIAL

## 2019-05-28 VITALS
HEART RATE: 79 BPM | DIASTOLIC BLOOD PRESSURE: 70 MMHG | RESPIRATION RATE: 18 BRPM | WEIGHT: 115.08 LBS | TEMPERATURE: 99 F | HEIGHT: 62 IN | OXYGEN SATURATION: 98 % | SYSTOLIC BLOOD PRESSURE: 104 MMHG

## 2019-05-28 DIAGNOSIS — K51.90 ULCERATIVE COLITIS, UNSPECIFIED, WITHOUT COMPLICATIONS: ICD-10-CM

## 2019-05-28 DIAGNOSIS — K46.9 UNSPECIFIED ABDOMINAL HERNIA WITHOUT OBSTRUCTION OR GANGRENE: Chronic | ICD-10-CM

## 2019-05-28 LAB
ALBUMIN SERPL ELPH-MCNC: 4.8 G/DL — SIGNIFICANT CHANGE UP (ref 3.3–5)
ALP SERPL-CCNC: 40 U/L — SIGNIFICANT CHANGE UP (ref 40–120)
ALT FLD-CCNC: 18 U/L — SIGNIFICANT CHANGE UP (ref 10–45)
ANION GAP SERPL CALC-SCNC: 12 MMOL/L — SIGNIFICANT CHANGE UP (ref 5–17)
AST SERPL-CCNC: 20 U/L — SIGNIFICANT CHANGE UP (ref 10–40)
BILIRUB DIRECT SERPL-MCNC: <0.2 MG/DL — SIGNIFICANT CHANGE UP (ref 0–0.2)
BILIRUB INDIRECT FLD-MCNC: >0.2 MG/DL — SIGNIFICANT CHANGE UP (ref 0.2–1)
BILIRUB SERPL-MCNC: 0.4 MG/DL — SIGNIFICANT CHANGE UP (ref 0.2–1.2)
BUN SERPL-MCNC: 8 MG/DL — SIGNIFICANT CHANGE UP (ref 7–23)
CALCIUM SERPL-MCNC: 10.2 MG/DL — SIGNIFICANT CHANGE UP (ref 8.4–10.5)
CHLORIDE SERPL-SCNC: 104 MMOL/L — SIGNIFICANT CHANGE UP (ref 96–108)
CO2 SERPL-SCNC: 25 MMOL/L — SIGNIFICANT CHANGE UP (ref 22–31)
CREAT SERPL-MCNC: 0.64 MG/DL — SIGNIFICANT CHANGE UP (ref 0.5–1.3)
CRP SERPL-MCNC: 0.11 MG/DL — SIGNIFICANT CHANGE UP (ref 0–0.4)
GLUCOSE SERPL-MCNC: 81 MG/DL — SIGNIFICANT CHANGE UP (ref 70–99)
HCT VFR BLD CALC: 42.6 % — SIGNIFICANT CHANGE UP (ref 34.5–45)
HGB BLD-MCNC: 14.2 G/DL — SIGNIFICANT CHANGE UP (ref 11.5–15.5)
MCHC RBC-ENTMCNC: 31 PG — SIGNIFICANT CHANGE UP (ref 27–34)
MCHC RBC-ENTMCNC: 33.3 GM/DL — SIGNIFICANT CHANGE UP (ref 32–36)
MCV RBC AUTO: 93 FL — SIGNIFICANT CHANGE UP (ref 80–100)
NRBC # BLD: 0 /100 WBCS — SIGNIFICANT CHANGE UP (ref 0–0)
PLATELET # BLD AUTO: 251 K/UL — SIGNIFICANT CHANGE UP (ref 150–400)
POTASSIUM SERPL-MCNC: 4.4 MMOL/L — SIGNIFICANT CHANGE UP (ref 3.5–5.3)
POTASSIUM SERPL-SCNC: 4.4 MMOL/L — SIGNIFICANT CHANGE UP (ref 3.5–5.3)
PROT SERPL-MCNC: 8 G/DL — SIGNIFICANT CHANGE UP (ref 6–8.3)
RBC # BLD: 4.58 M/UL — SIGNIFICANT CHANGE UP (ref 3.8–5.2)
RBC # FLD: 11.7 % — SIGNIFICANT CHANGE UP (ref 10.3–14.5)
SODIUM SERPL-SCNC: 141 MMOL/L — SIGNIFICANT CHANGE UP (ref 135–145)
WBC # BLD: 8.24 K/UL — SIGNIFICANT CHANGE UP (ref 3.8–10.5)
WBC # FLD AUTO: 8.24 K/UL — SIGNIFICANT CHANGE UP (ref 3.8–10.5)

## 2019-05-28 PROCEDURE — 80230 DRUG ASSAY INFLIXIMAB: CPT

## 2019-05-28 PROCEDURE — 86140 C-REACTIVE PROTEIN: CPT

## 2019-05-28 PROCEDURE — 36415 COLL VENOUS BLD VENIPUNCTURE: CPT

## 2019-05-28 PROCEDURE — 80076 HEPATIC FUNCTION PANEL: CPT

## 2019-05-28 PROCEDURE — 96413 CHEMO IV INFUSION 1 HR: CPT

## 2019-05-28 PROCEDURE — 96415 CHEMO IV INFUSION ADDL HR: CPT

## 2019-05-28 PROCEDURE — 85027 COMPLETE CBC AUTOMATED: CPT

## 2019-05-28 PROCEDURE — 80048 BASIC METABOLIC PNL TOTAL CA: CPT

## 2019-05-28 RX ORDER — DIPHENHYDRAMINE HCL 50 MG
50 CAPSULE ORAL ONCE
Refills: 0 | Status: COMPLETED | OUTPATIENT
Start: 2019-05-28 | End: 2019-05-28

## 2019-05-28 RX ORDER — ACETAMINOPHEN 500 MG
650 TABLET ORAL ONCE
Refills: 0 | Status: COMPLETED | OUTPATIENT
Start: 2019-05-28 | End: 2019-05-28

## 2019-05-28 RX ORDER — INFLIXIMAB-DYYB 120 MG/ML
600 INJECTION SUBCUTANEOUS ONCE
Refills: 0 | Status: COMPLETED | OUTPATIENT
Start: 2019-05-28 | End: 2019-05-28

## 2019-05-28 RX ADMIN — INFLIXIMAB-DYYB 155 MILLIGRAM(S): 120 INJECTION SUBCUTANEOUS at 11:23

## 2019-05-28 RX ADMIN — Medication 650 MILLIGRAM(S): at 11:59

## 2019-05-28 RX ADMIN — INFLIXIMAB-DYYB 600 MILLIGRAM(S): 120 INJECTION SUBCUTANEOUS at 13:23

## 2019-05-28 RX ADMIN — Medication 650 MILLIGRAM(S): at 11:05

## 2019-05-31 LAB
ANTIBODIES TO INFLIXIMAB (ATI) CONCENTRATION: < 3.1 CD:431151895 — SIGNIFICANT CHANGE UP
INFLIXIMAB AB SERPL-MCNC: <22 NG/ML — SIGNIFICANT CHANGE UP
INFLIXIMAB SERPL-MCNC: 9.2 UG/ML — SIGNIFICANT CHANGE UP
PROMETHEUS ANSER IFX RESULT: SIGNIFICANT CHANGE UP
PROMETHEUS LABORATORY FOOTER: SIGNIFICANT CHANGE UP
SERUM INFLIXIMAB (IFX) CONCENTRATION: 13.9 UG/ML — SIGNIFICANT CHANGE UP

## 2019-06-18 ENCOUNTER — APPOINTMENT (OUTPATIENT)
Dept: GASTROENTEROLOGY | Facility: HOSPITAL | Age: 24
End: 2019-06-18

## 2019-06-18 ENCOUNTER — OUTPATIENT (OUTPATIENT)
Dept: OUTPATIENT SERVICES | Facility: HOSPITAL | Age: 24
LOS: 1 days | Discharge: HOME | End: 2019-06-18
Payer: COMMERCIAL

## 2019-06-18 ENCOUNTER — RESULT REVIEW (OUTPATIENT)
Age: 24
End: 2019-06-18

## 2019-06-18 DIAGNOSIS — K46.9 UNSPECIFIED ABDOMINAL HERNIA WITHOUT OBSTRUCTION OR GANGRENE: Chronic | ICD-10-CM

## 2019-06-18 LAB
C DIFF GDH STL QL: NEGATIVE — SIGNIFICANT CHANGE UP
C DIFF GDH STL QL: SIGNIFICANT CHANGE UP

## 2019-06-18 PROCEDURE — 87493 C DIFF AMPLIFIED PROBE: CPT

## 2019-06-18 PROCEDURE — 87449 NOS EACH ORGANISM AG IA: CPT

## 2019-06-18 PROCEDURE — 45331 SIGMOIDOSCOPY AND BIOPSY: CPT

## 2019-06-18 PROCEDURE — 87324 CLOSTRIDIUM AG IA: CPT

## 2019-06-18 PROCEDURE — 88305 TISSUE EXAM BY PATHOLOGIST: CPT

## 2019-06-19 LAB — SURGICAL PATHOLOGY STUDY: SIGNIFICANT CHANGE UP

## 2019-06-20 ENCOUNTER — RX RENEWAL (OUTPATIENT)
Age: 24
End: 2019-06-20

## 2019-06-20 RX ORDER — PREDNISONE 5 MG/1
5 TABLET ORAL
Qty: 60 | Refills: 0 | Status: DISCONTINUED | COMMUNITY
Start: 2018-09-21 | End: 2019-06-20

## 2019-06-20 RX ORDER — PREDNISONE 10 MG/1
10 TABLET ORAL DAILY
Qty: 90 | Refills: 0 | Status: DISCONTINUED | COMMUNITY
Start: 2019-04-18 | End: 2019-06-20

## 2019-06-20 RX ORDER — PREDNISONE 20 MG/1
20 TABLET ORAL DAILY
Qty: 60 | Refills: 0 | Status: DISCONTINUED | COMMUNITY
Start: 2019-02-28 | End: 2019-06-20

## 2019-06-20 RX ORDER — PREDNISONE 5 MG/1
5 TABLET ORAL DAILY
Qty: 60 | Refills: 0 | Status: DISCONTINUED | COMMUNITY
Start: 2019-03-12 | End: 2019-06-20

## 2019-06-20 RX ORDER — PREDNISONE 1 MG/1
1 TABLET ORAL
Qty: 120 | Refills: 0 | Status: DISCONTINUED | COMMUNITY
Start: 2019-01-02 | End: 2019-06-20

## 2019-07-07 PROBLEM — A49.8 CLOSTRIDIUM DIFFICILE INFECTION: Status: ACTIVE | Noted: 2018-10-25

## 2019-07-24 ENCOUNTER — APPOINTMENT (OUTPATIENT)
Dept: GASTROENTEROLOGY | Facility: CLINIC | Age: 24
End: 2019-07-24
Payer: COMMERCIAL

## 2019-07-24 ENCOUNTER — OUTPATIENT (OUTPATIENT)
Dept: OUTPATIENT SERVICES | Facility: HOSPITAL | Age: 24
LOS: 1 days | End: 2019-07-24
Payer: COMMERCIAL

## 2019-07-24 ENCOUNTER — APPOINTMENT (OUTPATIENT)
Dept: INFUSION THERAPY | Facility: HOSPITAL | Age: 24
End: 2019-07-24

## 2019-07-24 VITALS
WEIGHT: 116 LBS | DIASTOLIC BLOOD PRESSURE: 74 MMHG | HEIGHT: 63 IN | OXYGEN SATURATION: 98 % | BODY MASS INDEX: 20.55 KG/M2 | TEMPERATURE: 98.3 F | RESPIRATION RATE: 14 BRPM | SYSTOLIC BLOOD PRESSURE: 118 MMHG | HEART RATE: 104 BPM

## 2019-07-24 VITALS
RESPIRATION RATE: 18 BRPM | OXYGEN SATURATION: 99 % | HEART RATE: 67 BPM | TEMPERATURE: 99 F | SYSTOLIC BLOOD PRESSURE: 97 MMHG | DIASTOLIC BLOOD PRESSURE: 66 MMHG

## 2019-07-24 DIAGNOSIS — K46.9 UNSPECIFIED ABDOMINAL HERNIA WITHOUT OBSTRUCTION OR GANGRENE: Chronic | ICD-10-CM

## 2019-07-24 DIAGNOSIS — K51.90 ULCERATIVE COLITIS, UNSPECIFIED, WITHOUT COMPLICATIONS: ICD-10-CM

## 2019-07-24 LAB
ALBUMIN SERPL ELPH-MCNC: 4 G/DL — SIGNIFICANT CHANGE UP (ref 3.3–5)
ALP SERPL-CCNC: 40 U/L — SIGNIFICANT CHANGE UP (ref 40–120)
ALT FLD-CCNC: 15 U/L — SIGNIFICANT CHANGE UP (ref 10–45)
ANION GAP SERPL CALC-SCNC: 11 MMOL/L — SIGNIFICANT CHANGE UP (ref 5–17)
AST SERPL-CCNC: 14 U/L — SIGNIFICANT CHANGE UP (ref 10–40)
BASOPHILS # BLD AUTO: 0.04 K/UL — SIGNIFICANT CHANGE UP (ref 0–0.2)
BASOPHILS NFR BLD AUTO: 0.6 % — SIGNIFICANT CHANGE UP (ref 0–2)
BILIRUB DIRECT SERPL-MCNC: <0.2 MG/DL — SIGNIFICANT CHANGE UP (ref 0–0.2)
BILIRUB INDIRECT FLD-MCNC: >0.2 MG/DL — SIGNIFICANT CHANGE UP (ref 0.2–1)
BILIRUB SERPL-MCNC: 0.4 MG/DL — SIGNIFICANT CHANGE UP (ref 0.2–1.2)
BUN SERPL-MCNC: 9 MG/DL — SIGNIFICANT CHANGE UP (ref 7–23)
CALCIUM SERPL-MCNC: 9 MG/DL — SIGNIFICANT CHANGE UP (ref 8.4–10.5)
CHLORIDE SERPL-SCNC: 104 MMOL/L — SIGNIFICANT CHANGE UP (ref 96–108)
CO2 SERPL-SCNC: 25 MMOL/L — SIGNIFICANT CHANGE UP (ref 22–31)
CREAT SERPL-MCNC: 0.73 MG/DL — SIGNIFICANT CHANGE UP (ref 0.5–1.3)
CRP SERPL-MCNC: 1.2 MG/DL — HIGH (ref 0–0.4)
EOSINOPHIL # BLD AUTO: 0.13 K/UL — SIGNIFICANT CHANGE UP (ref 0–0.5)
EOSINOPHIL NFR BLD AUTO: 2 % — SIGNIFICANT CHANGE UP (ref 0–6)
GLUCOSE SERPL-MCNC: 73 MG/DL — SIGNIFICANT CHANGE UP (ref 70–99)
HCT VFR BLD CALC: 37.1 % — SIGNIFICANT CHANGE UP (ref 34.5–45)
HGB BLD-MCNC: 12 G/DL — SIGNIFICANT CHANGE UP (ref 11.5–15.5)
IMM GRANULOCYTES NFR BLD AUTO: 0.2 % — SIGNIFICANT CHANGE UP (ref 0–1.5)
LYMPHOCYTES # BLD AUTO: 2.61 K/UL — SIGNIFICANT CHANGE UP (ref 1–3.3)
LYMPHOCYTES # BLD AUTO: 40.3 % — SIGNIFICANT CHANGE UP (ref 13–44)
MCHC RBC-ENTMCNC: 30 PG — SIGNIFICANT CHANGE UP (ref 27–34)
MCHC RBC-ENTMCNC: 32.3 GM/DL — SIGNIFICANT CHANGE UP (ref 32–36)
MCV RBC AUTO: 92.8 FL — SIGNIFICANT CHANGE UP (ref 80–100)
MONOCYTES # BLD AUTO: 0.77 K/UL — SIGNIFICANT CHANGE UP (ref 0–0.9)
MONOCYTES NFR BLD AUTO: 11.9 % — SIGNIFICANT CHANGE UP (ref 2–14)
NEUTROPHILS # BLD AUTO: 2.92 K/UL — SIGNIFICANT CHANGE UP (ref 1.8–7.4)
NEUTROPHILS NFR BLD AUTO: 45 % — SIGNIFICANT CHANGE UP (ref 43–77)
NRBC # BLD: 0 /100 WBCS — SIGNIFICANT CHANGE UP (ref 0–0)
PLATELET # BLD AUTO: 259 K/UL — SIGNIFICANT CHANGE UP (ref 150–400)
POTASSIUM SERPL-MCNC: 3.8 MMOL/L — SIGNIFICANT CHANGE UP (ref 3.5–5.3)
POTASSIUM SERPL-SCNC: 3.8 MMOL/L — SIGNIFICANT CHANGE UP (ref 3.5–5.3)
PROT SERPL-MCNC: 7 G/DL — SIGNIFICANT CHANGE UP (ref 6–8.3)
RBC # BLD: 4 M/UL — SIGNIFICANT CHANGE UP (ref 3.8–5.2)
RBC # FLD: 11.7 % — SIGNIFICANT CHANGE UP (ref 10.3–14.5)
SODIUM SERPL-SCNC: 140 MMOL/L — SIGNIFICANT CHANGE UP (ref 135–145)
WBC # BLD: 6.48 K/UL — SIGNIFICANT CHANGE UP (ref 3.8–10.5)
WBC # FLD AUTO: 6.48 K/UL — SIGNIFICANT CHANGE UP (ref 3.8–10.5)

## 2019-07-24 PROCEDURE — 80076 HEPATIC FUNCTION PANEL: CPT

## 2019-07-24 PROCEDURE — 85025 COMPLETE CBC W/AUTO DIFF WBC: CPT

## 2019-07-24 PROCEDURE — 86140 C-REACTIVE PROTEIN: CPT

## 2019-07-24 PROCEDURE — 80230 DRUG ASSAY INFLIXIMAB: CPT

## 2019-07-24 PROCEDURE — 80048 BASIC METABOLIC PNL TOTAL CA: CPT

## 2019-07-24 PROCEDURE — 36415 COLL VENOUS BLD VENIPUNCTURE: CPT

## 2019-07-24 PROCEDURE — 96415 CHEMO IV INFUSION ADDL HR: CPT

## 2019-07-24 PROCEDURE — 96413 CHEMO IV INFUSION 1 HR: CPT

## 2019-07-24 PROCEDURE — 99215 OFFICE O/P EST HI 40 MIN: CPT

## 2019-07-24 RX ORDER — INFLIXIMAB-DYYB 120 MG/ML
600 INJECTION SUBCUTANEOUS ONCE
Refills: 0 | Status: COMPLETED | OUTPATIENT
Start: 2019-07-24 | End: 2019-07-24

## 2019-07-24 RX ORDER — PHENOL 1.4 %
600-400 AEROSOL, SPRAY (ML) MUCOUS MEMBRANE DAILY
Qty: 60 | Refills: 2 | Status: DISCONTINUED | COMMUNITY
Start: 2018-11-26 | End: 2019-07-24

## 2019-07-24 RX ORDER — LORATADINE 10 MG/1
10 TABLET ORAL ONCE
Refills: 0 | Status: COMPLETED | OUTPATIENT
Start: 2019-07-24 | End: 2019-07-24

## 2019-07-24 RX ORDER — ACETAMINOPHEN 500 MG
650 TABLET ORAL ONCE
Refills: 0 | Status: COMPLETED | OUTPATIENT
Start: 2019-07-24 | End: 2019-07-24

## 2019-07-24 RX ADMIN — LORATADINE 10 MILLIGRAM(S): 10 TABLET ORAL at 10:47

## 2019-07-24 RX ADMIN — INFLIXIMAB-DYYB 600 MILLIGRAM(S): 120 INJECTION SUBCUTANEOUS at 13:03

## 2019-07-24 RX ADMIN — INFLIXIMAB-DYYB 155 MILLIGRAM(S): 120 INJECTION SUBCUTANEOUS at 11:03

## 2019-07-24 RX ADMIN — Medication 650 MILLIGRAM(S): at 11:02

## 2019-07-24 RX ADMIN — Medication 650 MILLIGRAM(S): at 10:47

## 2019-07-25 NOTE — HISTORY OF PRESENT ILLNESS
[de-identified] : 23 Y F with UC, diagnosed in 8/2018 (extent was proximal TC on initial colonoscopy), complicated by recurrent C diff infection x 3, s/p therapy with vancomycin x 2, fidaxomicin x 1, with resolution of infection (C diff PCR is now negative). Initially felt 85-90% improved s/p 3 loading doses of IFX while on Pred taper now presents for follow-up symptomatic.\par \par Patient states in the last 2 weeks she has had worsening symptoms described as diarrhea with 2 episodes of bloody diarrhea today. Patient reports at least 5 watery BMs daily. Also reports urgency, tenesmus, chills, cramping lower abdominal pain, and nausea denies fever, vomiting, melena. Patient believes that she may have improvement after the infusion but does not last till her next dose. Denies infusion reaction . \par \par No EIMs. \par \par PRIOR HX:\par 23 F with new onset symptoms of bloody diarrhea, diagnosed with UC, sent by primary GI, Dr Martinez for further management. \par Pt says 6 weeks ago, she started having several episodes of bloody diarrhea a day with abd pain and significant weight loss. She went to her pediatrician who diagnosed her with C diff (test positive per pt) and started her on flayl with no improvement. She was referred to Dr Martinez, repeat C diff test (-) and stool studies (-). She underwent flex sig 8/15/18 with showed moderate active colitis from the anal verge to the distal TC (extent of exam) with biopsies consistent with chronic active colitis. She was started on vanco PO and prednisone 40 mg, has been on latter for 2 weeks now with significant improvement and feels 95% better. She now goes 2-5 times/day, scant blood, minimal pain. No fatigue, but unable to sleep d/t pred and had acid reflux. \par \par She has had a "sensitive stomach" for as long as she can remember- foods such as daily, oily or spicy foods give her diarrhea as well as stress and sleep deprivation. \par \par Endoscopy: flex sig 8/15/18 with showed moderate active colitis from the anal verge to the distal TC (extent of exam) \par Pathology: chronic active colitis\par \par SH: , two young children, no smoking/ EtOH\par FH: no h/o IBD\par Allergies: NKDA

## 2019-07-25 NOTE — CONSULT LETTER
[Dear  ___] : Dear  [unfilled], [Courtesy Letter:] : I had the pleasure of seeing your patient, [unfilled], in my office today. [Please see my note below.] : Please see my note below. [Sincerely,] : Sincerely, [FreeTextEntry3] : Ming Durham MD\par Director, IBD Program\par [facility], [facility]\par \par Associate Professor of Medicine\par [employer]\par [employer] at [facility]\par

## 2019-07-25 NOTE — PHYSICAL EXAM
[General Appearance - In No Acute Distress] : in no acute distress [Sclera] : the sclera and conjunctiva were normal [General Appearance - Alert] : alert [Examination Of The Oral Cavity] : the lips and gums were normal [Neck Appearance] : the appearance of the neck was normal [Abdomen Soft] : soft [Edema] : there was no peripheral edema [Cervical Lymph Nodes Enlarged Anterior Bilaterally] : anterior cervical [No CVA Tenderness] : no ~M costovertebral angle tenderness [Abnormal Walk] : normal gait [Oriented To Time, Place, And Person] : oriented to person, place, and time [Musculoskeletal - Swelling] : no joint swelling seen [PERRL With Normal Accommodation] : pupils were equal in size, round, and reactive to light [Outer Ear] : the ears and nose were normal in appearance [Extraocular Movements] : extraocular movements were intact [Hearing Threshold Finger Rub Not [place]] : hearing was normal [Oropharynx] : the oropharynx was normal [Exaggerated Use Of Accessory Muscles For Inspiration] : no accessory muscle use [] : no hepato-splenomegaly [RLQ] : in the right lower quadrant [No Focal Deficits] : no focal deficits [Affect] : the affect was normal [Mood] : the mood was normal [FreeTextEntry1] : acneform lesions on face

## 2019-07-25 NOTE — ASSESSMENT
[FreeTextEntry1] : 22 y/o F with extensive UC (Dx 8/2018) complicated by recurrent C diff infection x 3, s/p therapy with vancomycin x 2, fidaxomicin x 1, with resolution of infection (C diff PCR is now negative). No response on 5ASA. Initial plan was to start VDZ, but it was denied by insurance. Furthermore, given ongoing severe symptoms steroid resistance she was admitted to the hospital and was started on IFX. She is now on monotherapy IFX 10mg/kg s/p 2 maintenance doses currently symptomatic with an elevated CRP after tapering off steroids.  I believe, given her initial benefit, this is consistent with secondary loss of response.\par \par # Entensive UC\par - Recent flex/sig showed improvement from prior, however, pt is currently symptomatic with likely active flare given associated elevation in CRP\par - Check C. diff and GI PCR panel to r/o infectious etiology\par - Had an extensive discussion with the patient regarding potential treatment options including continued IFX therapy but at [id number], although she is at a therapeutic level with no antibodies (not my first choice), course of Prednisone for symptomatic control, or changing therapy to VDZ\par - At this time, pt symptoms are manageable and she is unsure at which option she would like to pursue, will await stool studies and decide on next course of treatment pending these results\par - discussed best timing of pregnancy after confirming mucosal healing this summer.\par \par IBS overlap\par - Can consider desipramine once active flare r/o\par \par #HCM\par - flu shot with PCP\par - low titer to Hep B; repeat with PCP \par - immune to MMR \par - immune to varicella \par - B12 level 372 \par - Vit D 41.9 \par - iron panel NML \par - can discuss dexa at next visit once off steroids \par - f/b OB for papsmears and oral birth control \par - cont to avoid tobacco use and NSAID use \par \par Follow-up via phone for stool study results

## 2019-07-30 LAB
ANTIBODIES TO INFLIXIMAB (ATI) CONCENTRATION: < 3.1 U/ML — SIGNIFICANT CHANGE UP
PROMETHEUS ANSER IFX RESULT: SIGNIFICANT CHANGE UP
PROMETHEUS LABORATORY FOOTER: SIGNIFICANT CHANGE UP
SERUM INFLIXIMAB (IFX) CONCENTRATION: 15.1 UG/ML — SIGNIFICANT CHANGE UP

## 2019-08-01 LAB
INFLIXIMAB AB SERPL-MCNC: <22 NG/ML — SIGNIFICANT CHANGE UP
INFLIXIMAB SERPL-MCNC: 8.4 UG/ML — SIGNIFICANT CHANGE UP

## 2019-08-16 RX ORDER — LORATADINE 10 MG/1
10 TABLET ORAL DAILY
Refills: 0 | Status: DISCONTINUED | OUTPATIENT
Start: 2019-08-19 | End: 2019-09-03

## 2019-08-16 RX ORDER — LORATADINE 10 MG/1
10 TABLET ORAL DAILY
Refills: 0 | Status: DISCONTINUED | OUTPATIENT
Start: 2020-04-01 | End: 2020-04-16

## 2019-08-19 ENCOUNTER — APPOINTMENT (OUTPATIENT)
Age: 24
End: 2019-08-19

## 2019-08-19 ENCOUNTER — OUTPATIENT (OUTPATIENT)
Dept: OUTPATIENT SERVICES | Facility: HOSPITAL | Age: 24
LOS: 1 days | End: 2019-08-19
Payer: COMMERCIAL

## 2019-08-19 ENCOUNTER — APPOINTMENT (OUTPATIENT)
Dept: GASTROENTEROLOGY | Facility: CLINIC | Age: 24
End: 2019-08-19
Payer: COMMERCIAL

## 2019-08-19 VITALS
SYSTOLIC BLOOD PRESSURE: 103 MMHG | RESPIRATION RATE: 18 BRPM | TEMPERATURE: 99 F | OXYGEN SATURATION: 98 % | DIASTOLIC BLOOD PRESSURE: 67 MMHG | HEART RATE: 96 BPM

## 2019-08-19 VITALS
OXYGEN SATURATION: 98 % | WEIGHT: 115 LBS | BODY MASS INDEX: 20.38 KG/M2 | HEART RATE: 101 BPM | RESPIRATION RATE: 16 BRPM | HEIGHT: 63 IN | DIASTOLIC BLOOD PRESSURE: 70 MMHG | SYSTOLIC BLOOD PRESSURE: 114 MMHG

## 2019-08-19 DIAGNOSIS — K51.30 ULCERATIVE (CHRONIC) RECTOSIGMOIDITIS W/OUT COMPLICATIONS: ICD-10-CM

## 2019-08-19 DIAGNOSIS — K46.9 UNSPECIFIED ABDOMINAL HERNIA WITHOUT OBSTRUCTION OR GANGRENE: Chronic | ICD-10-CM

## 2019-08-19 DIAGNOSIS — R05 COUGH: ICD-10-CM

## 2019-08-19 DIAGNOSIS — K51.90 ULCERATIVE COLITIS, UNSPECIFIED, WITHOUT COMPLICATIONS: ICD-10-CM

## 2019-08-19 LAB
ALBUMIN SERPL ELPH-MCNC: 3.8 G/DL — SIGNIFICANT CHANGE UP (ref 3.3–5)
ALP SERPL-CCNC: 52 U/L — SIGNIFICANT CHANGE UP (ref 40–120)
ALT FLD-CCNC: 10 U/L — SIGNIFICANT CHANGE UP (ref 10–45)
ANION GAP SERPL CALC-SCNC: 11 MMOL/L — SIGNIFICANT CHANGE UP (ref 5–17)
AST SERPL-CCNC: 10 U/L — SIGNIFICANT CHANGE UP (ref 10–40)
BILIRUB DIRECT SERPL-MCNC: <0.2 MG/DL — SIGNIFICANT CHANGE UP (ref 0–0.2)
BILIRUB INDIRECT FLD-MCNC: >0 MG/DL — LOW (ref 0.2–1)
BILIRUB SERPL-MCNC: 0.2 MG/DL — SIGNIFICANT CHANGE UP (ref 0.2–1.2)
BUN SERPL-MCNC: 7 MG/DL — SIGNIFICANT CHANGE UP (ref 7–23)
CALCIUM SERPL-MCNC: 9.4 MG/DL — SIGNIFICANT CHANGE UP (ref 8.4–10.5)
CHLORIDE SERPL-SCNC: 105 MMOL/L — SIGNIFICANT CHANGE UP (ref 96–108)
CO2 SERPL-SCNC: 24 MMOL/L — SIGNIFICANT CHANGE UP (ref 22–31)
CREAT SERPL-MCNC: 0.66 MG/DL — SIGNIFICANT CHANGE UP (ref 0.5–1.3)
CRP SERPL-MCNC: 2.44 MG/DL — HIGH (ref 0–0.4)
GLUCOSE SERPL-MCNC: 94 MG/DL — SIGNIFICANT CHANGE UP (ref 70–99)
HCT VFR BLD CALC: 35 % — SIGNIFICANT CHANGE UP (ref 34.5–45)
HGB BLD-MCNC: 11.4 G/DL — LOW (ref 11.5–15.5)
MCHC RBC-ENTMCNC: 30.1 PG — SIGNIFICANT CHANGE UP (ref 27–34)
MCHC RBC-ENTMCNC: 32.6 GM/DL — SIGNIFICANT CHANGE UP (ref 32–36)
MCV RBC AUTO: 92.3 FL — SIGNIFICANT CHANGE UP (ref 80–100)
NRBC # BLD: 0 /100 WBCS — SIGNIFICANT CHANGE UP (ref 0–0)
PLATELET # BLD AUTO: 374 K/UL — SIGNIFICANT CHANGE UP (ref 150–400)
POTASSIUM SERPL-MCNC: 3.9 MMOL/L — SIGNIFICANT CHANGE UP (ref 3.5–5.3)
POTASSIUM SERPL-SCNC: 3.9 MMOL/L — SIGNIFICANT CHANGE UP (ref 3.5–5.3)
PROT SERPL-MCNC: 7.3 G/DL — SIGNIFICANT CHANGE UP (ref 6–8.3)
RBC # BLD: 3.79 M/UL — LOW (ref 3.8–5.2)
RBC # FLD: 12.6 % — SIGNIFICANT CHANGE UP (ref 10.3–14.5)
SODIUM SERPL-SCNC: 140 MMOL/L — SIGNIFICANT CHANGE UP (ref 135–145)
WBC # BLD: 8.66 K/UL — SIGNIFICANT CHANGE UP (ref 3.8–10.5)
WBC # FLD AUTO: 8.66 K/UL — SIGNIFICANT CHANGE UP (ref 3.8–10.5)

## 2019-08-19 PROCEDURE — 99214 OFFICE O/P EST MOD 30 MIN: CPT

## 2019-08-19 PROCEDURE — 71045 X-RAY EXAM CHEST 1 VIEW: CPT | Mod: 26

## 2019-08-19 PROCEDURE — 71045 X-RAY EXAM CHEST 1 VIEW: CPT

## 2019-08-19 RX ORDER — PREDNISONE 10 MG/1
10 TABLET ORAL DAILY
Qty: 60 | Refills: 0 | Status: DISCONTINUED | COMMUNITY
Start: 2019-08-05 | End: 2019-08-19

## 2019-08-19 RX ORDER — VEDOLIZUMAB 108 MG/.68ML
300 INJECTION, SOLUTION SUBCUTANEOUS ONCE
Refills: 0 | Status: COMPLETED | OUTPATIENT
Start: 2019-08-19 | End: 2019-08-19

## 2019-08-19 RX ORDER — HYOSCYAMINE SULFATE 0.12 MG/1
0.12 TABLET SUBLINGUAL
Qty: 30 | Refills: 0 | Status: DISCONTINUED | COMMUNITY
Start: 2019-02-25

## 2019-08-19 RX ORDER — METRONIDAZOLE 7.5 MG/G
0.75 GEL VAGINAL
Qty: 70 | Refills: 0 | Status: DISCONTINUED | COMMUNITY
Start: 2019-06-13

## 2019-08-19 RX ORDER — ACETAMINOPHEN 500 MG
650 TABLET ORAL ONCE
Refills: 0 | Status: COMPLETED | OUTPATIENT
Start: 2019-08-19 | End: 2019-08-19

## 2019-08-19 RX ORDER — INFLIXIMAB 100 MG/10ML
INJECTION, POWDER, LYOPHILIZED, FOR SOLUTION INTRAVENOUS
Refills: 0 | Status: DISCONTINUED | COMMUNITY
End: 2019-08-19

## 2019-08-19 RX ORDER — HYOSCYAMINE SULFATE 0.12 MG/1
0.12 TABLET, ORALLY DISINTEGRATING ORAL 4 TIMES DAILY
Qty: 30 | Refills: 2 | Status: DISCONTINUED | COMMUNITY
Start: 2019-02-25 | End: 2019-08-19

## 2019-08-19 RX ADMIN — VEDOLIZUMAB 300 MILLIGRAM(S): 108 INJECTION, SOLUTION SUBCUTANEOUS at 13:00

## 2019-08-19 RX ADMIN — VEDOLIZUMAB 500 MILLIGRAM(S): 108 INJECTION, SOLUTION SUBCUTANEOUS at 12:30

## 2019-08-19 RX ADMIN — LORATADINE 10 MILLIGRAM(S): 10 TABLET ORAL at 12:20

## 2019-08-19 RX ADMIN — Medication 650 MILLIGRAM(S): at 12:20

## 2019-08-19 RX ADMIN — Medication 650 MILLIGRAM(S): at 12:38

## 2019-08-19 NOTE — CONSULT LETTER
[Dear  ___] : Dear  [unfilled], [Courtesy Letter:] : I had the pleasure of seeing your patient, [unfilled], in my office today. [Please see my note below.] : Please see my note below. [Sincerely,] : Sincerely, [FreeTextEntry3] : Nella Melton NP\par [facility]\par [facility]\par IBD Program\par

## 2019-08-19 NOTE — HISTORY OF PRESENT ILLNESS
[de-identified] : 24 Y F with UC, diagnosed in 8/2018 (extent was proximal TC on initial colonoscopy), complicated by recurrent C diff infection x 3, s/p therapy with vancomycin x 2, fidaxomicin x 1, with resolution of infection (C diff PCR is now negative). Initially felt 85-90% improved s/p 3 loading doses of IFX while on Pred taper but at last visit she felt symptomatic with CRP elevation. Now s/p 1st loading dose of Vedolizumab. Here for sick visit as Nurse Manager NP Corie called reporting patient reported a cough at infusion with low grade temp (100).\par \par Patient reports the last 2 weeks she's had a "cold" that is "improving." Symptoms include "congestion" and "cough." Appetite okay. Was not checking temp at home. 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 Mild headaches on occasion. + nasal drainage. No throat pain. No ear pain. Coughing up clear/white or green mucous. No sick contacts.  \par \par Patient reports at least 5 watery BMs daily. Also reports urgency, tenesmus, cramping lower abdominal pain before BM and nausea. Denies fever, vomiting, melena. Denies infusion reaction from Vedo today. Happy infusion was 30 minutes.  \par \par No EIMs. \par \par PRIOR HX:\par 23 F with new onset symptoms of bloody diarrhea, diagnosed with UC, sent by primary GI, Dr Martinez for further management. \par Pt says 6 weeks ago, she started having several episodes of bloody diarrhea a day with abd pain and significant weight loss. She went to her pediatrician who diagnosed her with C diff (test positive per pt) and started her on flayl with no improvement. She was referred to Dr Martinez, repeat C diff test (-) and stool studies (-). She underwent flex sig 8/15/18 with showed moderate active colitis from the anal verge to the distal TC (extent of exam) with biopsies consistent with chronic active colitis. She was started on vanco PO and prednisone 40 mg, has been on latter for 2 weeks now with significant improvement and feels 95% better. She now goes 2-5 times/day, scant blood, minimal pain. No fatigue, but unable to sleep d/t pred and had acid reflux. \par \par She has had a "sensitive stomach" for as long as she can remember- foods such as daily, oily or spicy foods give her diarrhea as well as stress and sleep deprivation. \par \par Endoscopy: flex sig 8/15/18 with showed moderate active colitis from the anal verge to the distal TC (extent of exam) \par Pathology: chronic active colitis\par \par SH: , two young children, no smoking/ EtOH\par FH: no h/o IBD\par Allergies: NKDA

## 2019-08-19 NOTE — PHYSICAL EXAM
[General Appearance - Alert] : alert [General Appearance - In No Acute Distress] : in no acute distress [Sclera] : the sclera and conjunctiva were normal [PERRL With Normal Accommodation] : pupils were equal in size, round, and reactive to light [Extraocular Movements] : extraocular movements were intact [Outer Ear] : the ears and nose were normal in appearance [Examination Of The Oral Cavity] : the lips and gums were normal [Hearing Threshold Finger Rub Not [place]] : hearing was normal [Nasal Cavity] : the nasal mucosa and septum were normal [Oropharynx] : the oropharynx was normal [Neck Appearance] : the appearance of the neck was normal [Respiration, Rhythm And Depth] : normal respiratory rhythm and effort [Exaggerated Use Of Accessory Muscles For Inspiration] : no accessory muscle use [Auscultation Breath Sounds / Voice Sounds] : lungs were clear to auscultation bilaterally [Edema] : there was no peripheral edema [Abdomen Soft] : soft [] : no hepato-splenomegaly [Abdomen Tenderness] : non-tender [Cervical Lymph Nodes Enlarged Anterior Bilaterally] : anterior cervical [Cervical Lymph Nodes Enlarged Posterior Bilaterally] : posterior cervical [Supraclavicular Lymph Nodes Enlarged Bilaterally] : supraclavicular [Abnormal Walk] : normal gait [No CVA Tenderness] : no ~M costovertebral angle tenderness [Musculoskeletal - Swelling] : no joint swelling seen [No Focal Deficits] : no focal deficits [Affect] : the affect was normal [Oriented To Time, Place, And Person] : oriented to person, place, and time [Mood] : the mood was normal [FreeTextEntry1] : acneform lesions on face

## 2019-08-19 NOTE — ASSESSMENT
[FreeTextEntry1] : 25 y/o F with extensive UC (Dx 8/2018) complicated by recurrent C diff infection x 3, s/p therapy with vancomycin x 2, fidaxomicin x 1, with resolution of infection (C diff PCR is now negative). No response on 5ASA. Initial plan was to start VDZ, but it was denied by insurance. Furthermore, she had ongoing severe symptom, steroid resistance and was admitted to the hospital and was started on IFX. However, despite 10mg/kg IFX and optimal drug levels, she had a secondary loss of response and presents today s/p 1st loading dose for acute visit for cough reported by Corie CHACKO in infusion. \par \par # Cough\par - pending xray read\par - lungs clear to auscultate\par - clear oropharynx and nasal passageways\par - given it is improving with no WBC, advised she cont to get rest and drink fluids, but see PCP if cough worsens/develops fever \par \par # Entensive UC\par - Recent flex/sig showed improvement from prior, however, pt continued to be symptomatic with likely active flare given associated elevation in CRP\par - s/p 1 loading dose of Vedo; 2nd dose due in 2 weeks; no ADRs to drug today, infusion went well \par - full GI PCR unable to be complete by [facility] lab; advised she repeat if diarrhea persist and drop it off by a [facility] lab in 2 weeks; shiga and campylobacter negative as was cdiff\par - declines prednisone as she reports she doesn't like side effects and doesn’t help \par - discussed best timing of pregnancy after confirming mucosal healing this summer\par \par IBS overlap\par - Can consider desipramine once active flare r/o\par \par #HCM\par - flu shot with PCP\par - low titer to Hep B; repeat with PCP \par - immune to MMR \par - immune to varicella \par - B12 level 372 \par - Vit D 41.9 \par - iron panel NML \par - can discuss dexa at next visit once acute issues resolve \par - f/b OB for papsmears and oral birth control \par - cont to avoid tobacco use and NSAID use \par \par Follow-up in 4 weeks, sooner if indicated

## 2019-08-21 DIAGNOSIS — K51.20 ULCERATIVE (CHRONIC) PROCTITIS WITHOUT COMPLICATIONS: ICD-10-CM

## 2019-08-22 LAB
ANTIBODIES TO VEDOLIZUMAB (ATV) CONCENTRATION: < 1.6 U/ML — SIGNIFICANT CHANGE UP
PROMETHEUS ANSER VEDOLIZUMAB RESULT: SIGNIFICANT CHANGE UP
PROMETHEUS LABORATORY FOOTER: SIGNIFICANT CHANGE UP
SERUM VEDOLIZUMAB (VDZ) CONCENTRATION: < 1.6 UG/ML — SIGNIFICANT CHANGE UP

## 2019-09-04 ENCOUNTER — APPOINTMENT (OUTPATIENT)
Age: 24
End: 2019-09-04

## 2019-09-04 ENCOUNTER — OUTPATIENT (OUTPATIENT)
Dept: OUTPATIENT SERVICES | Facility: HOSPITAL | Age: 24
LOS: 1 days | End: 2019-09-04
Payer: COMMERCIAL

## 2019-09-04 VITALS
TEMPERATURE: 98 F | RESPIRATION RATE: 18 BRPM | SYSTOLIC BLOOD PRESSURE: 110 MMHG | HEART RATE: 98 BPM | DIASTOLIC BLOOD PRESSURE: 71 MMHG | OXYGEN SATURATION: 99 % | HEIGHT: 62 IN | WEIGHT: 117.95 LBS

## 2019-09-04 DIAGNOSIS — K46.9 UNSPECIFIED ABDOMINAL HERNIA WITHOUT OBSTRUCTION OR GANGRENE: Chronic | ICD-10-CM

## 2019-09-04 DIAGNOSIS — K51.90 ULCERATIVE COLITIS, UNSPECIFIED, WITHOUT COMPLICATIONS: ICD-10-CM

## 2019-09-04 LAB
ALBUMIN SERPL ELPH-MCNC: 4.1 G/DL — SIGNIFICANT CHANGE UP (ref 3.3–5)
ALP SERPL-CCNC: 63 U/L — SIGNIFICANT CHANGE UP (ref 40–120)
ALT FLD-CCNC: 18 U/L — SIGNIFICANT CHANGE UP (ref 10–45)
ANION GAP SERPL CALC-SCNC: 11 MMOL/L — SIGNIFICANT CHANGE UP (ref 5–17)
AST SERPL-CCNC: 19 U/L — SIGNIFICANT CHANGE UP (ref 10–40)
BILIRUB DIRECT SERPL-MCNC: <0.2 MG/DL — SIGNIFICANT CHANGE UP (ref 0–0.2)
BILIRUB INDIRECT FLD-MCNC: >0 MG/DL — LOW (ref 0.2–1)
BILIRUB SERPL-MCNC: 0.2 MG/DL — SIGNIFICANT CHANGE UP (ref 0.2–1.2)
BUN SERPL-MCNC: 7 MG/DL — SIGNIFICANT CHANGE UP (ref 7–23)
CALCIUM SERPL-MCNC: 9.9 MG/DL — SIGNIFICANT CHANGE UP (ref 8.4–10.5)
CHLORIDE SERPL-SCNC: 102 MMOL/L — SIGNIFICANT CHANGE UP (ref 96–108)
CO2 SERPL-SCNC: 29 MMOL/L — SIGNIFICANT CHANGE UP (ref 22–31)
CREAT SERPL-MCNC: 0.61 MG/DL — SIGNIFICANT CHANGE UP (ref 0.5–1.3)
CRP SERPL-MCNC: 0.83 MG/DL — HIGH (ref 0–0.4)
GLUCOSE SERPL-MCNC: 94 MG/DL — SIGNIFICANT CHANGE UP (ref 70–99)
HCT VFR BLD CALC: 39.1 % — SIGNIFICANT CHANGE UP (ref 34.5–45)
HGB BLD-MCNC: 12.7 G/DL — SIGNIFICANT CHANGE UP (ref 11.5–15.5)
MCHC RBC-ENTMCNC: 30.5 PG — SIGNIFICANT CHANGE UP (ref 27–34)
MCHC RBC-ENTMCNC: 32.5 GM/DL — SIGNIFICANT CHANGE UP (ref 32–36)
MCV RBC AUTO: 93.8 FL — SIGNIFICANT CHANGE UP (ref 80–100)
NRBC # BLD: 0 /100 WBCS — SIGNIFICANT CHANGE UP (ref 0–0)
PLATELET # BLD AUTO: 366 K/UL — SIGNIFICANT CHANGE UP (ref 150–400)
POTASSIUM SERPL-MCNC: 4.1 MMOL/L — SIGNIFICANT CHANGE UP (ref 3.5–5.3)
POTASSIUM SERPL-SCNC: 4.1 MMOL/L — SIGNIFICANT CHANGE UP (ref 3.5–5.3)
PROT SERPL-MCNC: 8.3 G/DL — SIGNIFICANT CHANGE UP (ref 6–8.3)
RBC # BLD: 4.17 M/UL — SIGNIFICANT CHANGE UP (ref 3.8–5.2)
RBC # FLD: 12.6 % — SIGNIFICANT CHANGE UP (ref 10.3–14.5)
SODIUM SERPL-SCNC: 142 MMOL/L — SIGNIFICANT CHANGE UP (ref 135–145)
WBC # BLD: 11.6 K/UL — HIGH (ref 3.8–10.5)
WBC # FLD AUTO: 11.6 K/UL — HIGH (ref 3.8–10.5)

## 2019-09-04 PROCEDURE — 96413 CHEMO IV INFUSION 1 HR: CPT

## 2019-09-04 PROCEDURE — 85027 COMPLETE CBC AUTOMATED: CPT

## 2019-09-04 PROCEDURE — 80048 BASIC METABOLIC PNL TOTAL CA: CPT

## 2019-09-04 PROCEDURE — 36415 COLL VENOUS BLD VENIPUNCTURE: CPT

## 2019-09-04 PROCEDURE — 86140 C-REACTIVE PROTEIN: CPT

## 2019-09-04 PROCEDURE — 80280 DRUG ASSAY VEDOLIZUMAB: CPT

## 2019-09-04 PROCEDURE — 80076 HEPATIC FUNCTION PANEL: CPT

## 2019-09-04 RX ORDER — LORATADINE 10 MG/1
10 TABLET ORAL ONCE
Refills: 0 | Status: COMPLETED | OUTPATIENT
Start: 2019-09-04 | End: 2019-09-04

## 2019-09-04 RX ORDER — ACETAMINOPHEN 500 MG
650 TABLET ORAL ONCE
Refills: 0 | Status: COMPLETED | OUTPATIENT
Start: 2019-09-04 | End: 2019-09-04

## 2019-09-04 RX ORDER — VEDOLIZUMAB 108 MG/.68ML
300 INJECTION, SOLUTION SUBCUTANEOUS ONCE
Refills: 0 | Status: COMPLETED | OUTPATIENT
Start: 2019-09-04 | End: 2019-09-04

## 2019-09-04 RX ORDER — INFLIXIMAB-DYYB 120 MG/ML
600 INJECTION SUBCUTANEOUS ONCE
Refills: 0 | Status: DISCONTINUED | OUTPATIENT
Start: 2019-09-04 | End: 2019-09-04

## 2019-09-04 RX ADMIN — Medication 650 MILLIGRAM(S): at 12:00

## 2019-09-04 RX ADMIN — LORATADINE 10 MILLIGRAM(S): 10 TABLET ORAL at 12:18

## 2019-09-04 RX ADMIN — VEDOLIZUMAB 500 MILLIGRAM(S): 108 INJECTION, SOLUTION SUBCUTANEOUS at 12:28

## 2019-09-04 RX ADMIN — Medication 650 MILLIGRAM(S): at 12:19

## 2019-09-04 RX ADMIN — VEDOLIZUMAB 300 MILLIGRAM(S): 108 INJECTION, SOLUTION SUBCUTANEOUS at 13:00

## 2019-09-04 RX ADMIN — LORATADINE 10 MILLIGRAM(S): 10 TABLET ORAL at 12:00

## 2019-09-05 DIAGNOSIS — K51.20 ULCERATIVE (CHRONIC) PROCTITIS WITHOUT COMPLICATIONS: ICD-10-CM

## 2019-09-25 ENCOUNTER — OTHER (OUTPATIENT)
Age: 24
End: 2019-09-25

## 2019-09-25 DIAGNOSIS — I51.7 CARDIOMEGALY: ICD-10-CM

## 2019-10-01 RX ORDER — LORATADINE 10 MG/1
10 TABLET ORAL ONCE
Refills: 0 | Status: COMPLETED | OUTPATIENT
Start: 2019-10-02 | End: 2019-10-02

## 2019-10-01 RX ORDER — VEDOLIZUMAB 108 MG/.68ML
300 INJECTION, SOLUTION SUBCUTANEOUS ONCE
Refills: 0 | Status: COMPLETED | OUTPATIENT
Start: 2019-10-02 | End: 2019-10-02

## 2019-10-01 RX ORDER — ACETAMINOPHEN 500 MG
650 TABLET ORAL ONCE
Refills: 0 | Status: COMPLETED | OUTPATIENT
Start: 2019-10-02 | End: 2019-10-02

## 2019-10-02 ENCOUNTER — OUTPATIENT (OUTPATIENT)
Dept: OUTPATIENT SERVICES | Facility: HOSPITAL | Age: 24
LOS: 1 days | End: 2019-10-02
Payer: COMMERCIAL

## 2019-10-02 ENCOUNTER — APPOINTMENT (OUTPATIENT)
Age: 24
End: 2019-10-02

## 2019-10-02 VITALS
HEART RATE: 99 BPM | SYSTOLIC BLOOD PRESSURE: 110 MMHG | RESPIRATION RATE: 18 BRPM | OXYGEN SATURATION: 99 % | TEMPERATURE: 98 F | DIASTOLIC BLOOD PRESSURE: 78 MMHG

## 2019-10-02 DIAGNOSIS — K51.90 ULCERATIVE COLITIS, UNSPECIFIED, WITHOUT COMPLICATIONS: ICD-10-CM

## 2019-10-02 DIAGNOSIS — K46.9 UNSPECIFIED ABDOMINAL HERNIA WITHOUT OBSTRUCTION OR GANGRENE: Chronic | ICD-10-CM

## 2019-10-02 LAB
ALBUMIN SERPL ELPH-MCNC: 4 G/DL — SIGNIFICANT CHANGE UP (ref 3.3–5)
ALP SERPL-CCNC: 57 U/L — SIGNIFICANT CHANGE UP (ref 40–120)
ALT FLD-CCNC: 11 U/L — SIGNIFICANT CHANGE UP (ref 10–45)
ANION GAP SERPL CALC-SCNC: 10 MMOL/L — SIGNIFICANT CHANGE UP (ref 5–17)
AST SERPL-CCNC: 13 U/L — SIGNIFICANT CHANGE UP (ref 10–40)
BILIRUB DIRECT SERPL-MCNC: <0.2 MG/DL — SIGNIFICANT CHANGE UP (ref 0–0.2)
BILIRUB INDIRECT FLD-MCNC: >0 MG/DL — LOW (ref 0.2–1)
BILIRUB SERPL-MCNC: 0.2 MG/DL — SIGNIFICANT CHANGE UP (ref 0.2–1.2)
BUN SERPL-MCNC: 11 MG/DL — SIGNIFICANT CHANGE UP (ref 7–23)
CALCIUM SERPL-MCNC: 10.1 MG/DL — SIGNIFICANT CHANGE UP (ref 8.4–10.5)
CHLORIDE SERPL-SCNC: 103 MMOL/L — SIGNIFICANT CHANGE UP (ref 96–108)
CO2 SERPL-SCNC: 24 MMOL/L — SIGNIFICANT CHANGE UP (ref 22–31)
CREAT SERPL-MCNC: 0.66 MG/DL — SIGNIFICANT CHANGE UP (ref 0.5–1.3)
CRP SERPL-MCNC: 1.91 MG/DL — HIGH (ref 0–0.4)
GLUCOSE SERPL-MCNC: 108 MG/DL — HIGH (ref 70–99)
HCT VFR BLD CALC: 37.7 % — SIGNIFICANT CHANGE UP (ref 34.5–45)
HGB BLD-MCNC: 12.4 G/DL — SIGNIFICANT CHANGE UP (ref 11.5–15.5)
MCHC RBC-ENTMCNC: 29.9 PG — SIGNIFICANT CHANGE UP (ref 27–34)
MCHC RBC-ENTMCNC: 32.9 GM/DL — SIGNIFICANT CHANGE UP (ref 32–36)
MCV RBC AUTO: 90.8 FL — SIGNIFICANT CHANGE UP (ref 80–100)
NRBC # BLD: 0 /100 WBCS — SIGNIFICANT CHANGE UP (ref 0–0)
PLATELET # BLD AUTO: 328 K/UL — SIGNIFICANT CHANGE UP (ref 150–400)
POTASSIUM SERPL-MCNC: 4.7 MMOL/L — SIGNIFICANT CHANGE UP (ref 3.5–5.3)
POTASSIUM SERPL-SCNC: 4.7 MMOL/L — SIGNIFICANT CHANGE UP (ref 3.5–5.3)
PROT SERPL-MCNC: 8 G/DL — SIGNIFICANT CHANGE UP (ref 6–8.3)
RBC # BLD: 4.15 M/UL — SIGNIFICANT CHANGE UP (ref 3.8–5.2)
RBC # FLD: 12 % — SIGNIFICANT CHANGE UP (ref 10.3–14.5)
SODIUM SERPL-SCNC: 137 MMOL/L — SIGNIFICANT CHANGE UP (ref 135–145)
WBC # BLD: 10.19 K/UL — SIGNIFICANT CHANGE UP (ref 3.8–10.5)
WBC # FLD AUTO: 10.19 K/UL — SIGNIFICANT CHANGE UP (ref 3.8–10.5)

## 2019-10-02 PROCEDURE — 36415 COLL VENOUS BLD VENIPUNCTURE: CPT

## 2019-10-02 PROCEDURE — 85027 COMPLETE CBC AUTOMATED: CPT

## 2019-10-02 PROCEDURE — 86140 C-REACTIVE PROTEIN: CPT

## 2019-10-02 PROCEDURE — 80280 DRUG ASSAY VEDOLIZUMAB: CPT

## 2019-10-02 PROCEDURE — 80053 COMPREHEN METABOLIC PANEL: CPT

## 2019-10-02 PROCEDURE — 82248 BILIRUBIN DIRECT: CPT

## 2019-10-02 PROCEDURE — 96413 CHEMO IV INFUSION 1 HR: CPT

## 2019-10-02 RX ADMIN — VEDOLIZUMAB 300 MILLIGRAM(S): 108 INJECTION, SOLUTION SUBCUTANEOUS at 14:31

## 2019-10-02 RX ADMIN — LORATADINE 10 MILLIGRAM(S): 10 TABLET ORAL at 14:37

## 2019-10-02 RX ADMIN — VEDOLIZUMAB 500 MILLIGRAM(S): 108 INJECTION, SOLUTION SUBCUTANEOUS at 14:01

## 2019-10-02 RX ADMIN — Medication 650 MILLIGRAM(S): at 14:36

## 2019-10-03 DIAGNOSIS — K51.20 ULCERATIVE (CHRONIC) PROCTITIS WITHOUT COMPLICATIONS: ICD-10-CM

## 2019-10-07 LAB
ANTIBODIES TO VEDOLIZUMAB (ATV) CONCENTRATION: < 1.6 U/ML — SIGNIFICANT CHANGE UP
PROMETHEUS ANSER VEDOLIZUMAB RESULT: SIGNIFICANT CHANGE UP
PROMETHEUS LABORATORY FOOTER: SIGNIFICANT CHANGE UP
SERUM VEDOLIZUMAB (VDZ) CONCENTRATION: 36.6 UG/ML — SIGNIFICANT CHANGE UP

## 2019-10-08 ENCOUNTER — RX RENEWAL (OUTPATIENT)
Age: 24
End: 2019-10-08

## 2019-10-31 PROCEDURE — 87340 HEPATITIS B SURFACE AG IA: CPT

## 2019-10-31 PROCEDURE — 86707 HEPATITIS BE ANTIBODY: CPT

## 2019-10-31 PROCEDURE — 81003 URINALYSIS AUTO W/O SCOPE: CPT

## 2019-10-31 PROCEDURE — 86704 HEP B CORE ANTIBODY TOTAL: CPT

## 2019-10-31 PROCEDURE — 86705 HEP B CORE ANTIBODY IGM: CPT

## 2019-10-31 PROCEDURE — 80053 COMPREHEN METABOLIC PANEL: CPT

## 2019-10-31 PROCEDURE — 86850 RBC ANTIBODY SCREEN: CPT

## 2019-10-31 PROCEDURE — 99285 EMERGENCY DEPT VISIT HI MDM: CPT | Mod: 25

## 2019-10-31 PROCEDURE — 83690 ASSAY OF LIPASE: CPT

## 2019-10-31 PROCEDURE — 86900 BLOOD TYPING SEROLOGIC ABO: CPT

## 2019-10-31 PROCEDURE — 36415 COLL VENOUS BLD VENIPUNCTURE: CPT

## 2019-10-31 PROCEDURE — 86706 HEP B SURFACE ANTIBODY: CPT

## 2019-10-31 PROCEDURE — 85025 COMPLETE CBC W/AUTO DIFF WBC: CPT

## 2019-10-31 PROCEDURE — 85652 RBC SED RATE AUTOMATED: CPT

## 2019-10-31 PROCEDURE — 86480 TB TEST CELL IMMUN MEASURE: CPT

## 2019-10-31 PROCEDURE — 84702 CHORIONIC GONADOTROPIN TEST: CPT

## 2019-10-31 PROCEDURE — 85027 COMPLETE CBC AUTOMATED: CPT

## 2019-10-31 PROCEDURE — 83735 ASSAY OF MAGNESIUM: CPT

## 2019-10-31 PROCEDURE — 87350 HEPATITIS BE AG IA: CPT

## 2019-10-31 PROCEDURE — 80048 BASIC METABOLIC PNL TOTAL CA: CPT

## 2019-10-31 PROCEDURE — 86140 C-REACTIVE PROTEIN: CPT

## 2019-10-31 PROCEDURE — 83605 ASSAY OF LACTIC ACID: CPT

## 2019-10-31 PROCEDURE — 87324 CLOSTRIDIUM AG IA: CPT

## 2019-10-31 PROCEDURE — 84100 ASSAY OF PHOSPHORUS: CPT

## 2019-10-31 PROCEDURE — 87449 NOS EACH ORGANISM AG IA: CPT

## 2019-10-31 PROCEDURE — 86901 BLOOD TYPING SEROLOGIC RH(D): CPT

## 2019-11-06 ENCOUNTER — APPOINTMENT (OUTPATIENT)
Dept: GASTROENTEROLOGY | Facility: CLINIC | Age: 24
End: 2019-11-06
Payer: COMMERCIAL

## 2019-11-06 VITALS
OXYGEN SATURATION: 98 % | DIASTOLIC BLOOD PRESSURE: 70 MMHG | HEART RATE: 70 BPM | WEIGHT: 121 LBS | SYSTOLIC BLOOD PRESSURE: 110 MMHG | BODY MASS INDEX: 21.44 KG/M2 | HEIGHT: 63 IN | TEMPERATURE: 97.9 F | RESPIRATION RATE: 14 BRPM

## 2019-11-06 DIAGNOSIS — Z23 ENCOUNTER FOR IMMUNIZATION: ICD-10-CM

## 2019-11-06 PROCEDURE — 99214 OFFICE O/P EST MOD 30 MIN: CPT

## 2019-11-06 RX ORDER — HYDROCORTISONE 25 MG/G
2.5 CREAM TOPICAL
Qty: 1 | Refills: 2 | Status: DISCONTINUED | COMMUNITY
Start: 2018-11-26 | End: 2019-11-06

## 2019-11-06 NOTE — ASSESSMENT
[FreeTextEntry1] : 25 y/o F with extensive UC (Dx 8/2018) complicated by recurrent C diff infection x 3, s/p therapy with vancomycin x 2, fidaxomicin x 1, with resolution of infection (C diff PCR is now negative). No response on 5ASA. Initial plan was to start VDZ, but it was denied by insurance. Furthermore, she had ongoing severe symptoms, steroid resistance and was admitted to the hospital and was started on IFX. However, despite 10mg/kg IFX and optimal drug levels, she had a secondary loss of response and presents today s/p 3 loading doses of Vedolizumab with clinical improvement. \par \par # Entensive UC\par - Recent flex/sig showed improvement from prior, however, pt continued to be symptomatic with likely active flare given associated elevation in CRP\par - continue vedo, 1st maintenance dose due in 4 weeks; [place]; advised we confirm she is in remission first\par - cbc, cmp and crp reviewed- crp remains elevated, will cont to trend \par - discussed best timing of pregnancy after confirming mucosal healing \par - consider flex sig in Feb (6 mo after starting therapy) \par \par Cardiomegaly\par - saw cardiologist reports echo WNL; reports he will repeat in 6 months \par \par IBS overlap\par - Can consider desipramine if symptomatic again \par \par #HCM\par - flu shot administered today \par - low titer to Hep B; repeat with PCP \par - immune to MMR \par - immune to varicella \par - B12 level 372 \par - Vit D 41.9 \par - iron panel NML \par - DEXA ordered \par - f/b OB for papsmears and oral birth control \par - cont to avoid tobacco use and NSAID use \par \par F/U via phone with next infusion to go over vedo labs

## 2019-11-06 NOTE — CONSULT LETTER
[Dear  ___] : Dear  [unfilled], [Courtesy Letter:] : I had the pleasure of seeing your patient, [unfilled], in my office today. [Please see my note below.] : Please see my note below. [Sincerely,] : Sincerely, [FreeTextEntry3] : Nella Melton NP\par [facility]\par [facility]\par IBD Program\par

## 2019-11-06 NOTE — PHYSICAL EXAM
[General Appearance - Alert] : alert [General Appearance - In No Acute Distress] : in no acute distress [Sclera] : the sclera and conjunctiva were normal [PERRL With Normal Accommodation] : pupils were equal in size, round, and reactive to light [Extraocular Movements] : extraocular movements were intact [Outer Ear] : the ears and nose were normal in appearance [Hearing Threshold Finger Rub Not [place]] : hearing was normal [Examination Of The Oral Cavity] : the lips and gums were normal [Nasal Cavity] : the nasal mucosa and septum were normal [Oropharynx] : the oropharynx was normal [Neck Appearance] : the appearance of the neck was normal [Respiration, Rhythm And Depth] : normal respiratory rhythm and effort [Exaggerated Use Of Accessory Muscles For Inspiration] : no accessory muscle use [Auscultation Breath Sounds / Voice Sounds] : lungs were clear to auscultation bilaterally [Edema] : there was no peripheral edema [Abdomen Soft] : soft [Abdomen Tenderness] : non-tender [] : no hepato-splenomegaly [Cervical Lymph Nodes Enlarged Posterior Bilaterally] : posterior cervical [Cervical Lymph Nodes Enlarged Anterior Bilaterally] : anterior cervical [Supraclavicular Lymph Nodes Enlarged Bilaterally] : supraclavicular [No CVA Tenderness] : no ~M costovertebral angle tenderness [Abnormal Walk] : normal gait [Musculoskeletal - Swelling] : no joint swelling seen [No Focal Deficits] : no focal deficits [Oriented To Time, Place, And Person] : oriented to person, place, and time [Affect] : the affect was normal [Mood] : the mood was normal [FreeTextEntry1] : acneform lesions on face

## 2019-11-06 NOTE — HISTORY OF PRESENT ILLNESS
[de-identified] : 24 Y F with UC, diagnosed in 8/2018 (extent was proximal TC on initial colonoscopy), complicated by recurrent C diff infection x 3, s/p therapy with vancomycin x 2, fidaxomicin x 1, with resolution of infection (C diff PCR is now negative). Initially felt 85-90% improved s/p 3 loading doses of IFX while on Pred taper but at last visit she felt symptomatic with CRP elevation. Now s/p 3 loading doses of Vedo in clinical not biochemical remission. \par \par Patient reports she's having 1-2 formed BMs per day, no urgency or nocturnal symptoms. Denies fever, vomiting, melena. Denies infusion reaction from Vedo today. Happy infusion is 30 minutes.  Has gained 6lbs. doing more meditation to manage stress. \par \par No EIMs. \par \par PRIOR HX:\par 23 F with new onset symptoms of bloody diarrhea, diagnosed with UC, sent by primary GI, Dr Martinez for further management. \par Pt says 6 weeks ago, she started having several episodes of bloody diarrhea a day with abd pain and significant weight loss. She went to her pediatrician who diagnosed her with C diff (test positive per pt) and started her on flayl with no improvement. She was referred to Dr Martinez, repeat C diff test (-) and stool studies (-). She underwent flex sig 8/15/18 with showed moderate active colitis from the anal verge to the distal TC (extent of exam) with biopsies consistent with chronic active colitis. She was started on vanco PO and prednisone 40 mg, has been on latter for 2 weeks now with significant improvement and feels 95% better. She now goes 2-5 times/day, scant blood, minimal pain. No fatigue, but unable to sleep d/t pred and had acid reflux. \par \par She has had a "sensitive stomach" for as long as she can remember- foods such as daily, oily or spicy foods give her diarrhea as well as stress and sleep deprivation. \par \par Endoscopy: flex sig 8/15/18 with showed moderate active colitis from the anal verge to the distal TC (extent of exam) \par Pathology: chronic active colitis\par \par SH: , two young children, no smoking/ EtOH\par FH: no h/o IBD\par Allergies: NKDA

## 2019-11-27 ENCOUNTER — OUTPATIENT (OUTPATIENT)
Dept: OUTPATIENT SERVICES | Facility: HOSPITAL | Age: 24
LOS: 1 days | End: 2019-11-27
Payer: COMMERCIAL

## 2019-11-27 ENCOUNTER — APPOINTMENT (OUTPATIENT)
Age: 24
End: 2019-11-27

## 2019-11-27 VITALS
DIASTOLIC BLOOD PRESSURE: 60 MMHG | TEMPERATURE: 99 F | OXYGEN SATURATION: 99 % | SYSTOLIC BLOOD PRESSURE: 117 MMHG | HEART RATE: 65 BPM | RESPIRATION RATE: 18 BRPM

## 2019-11-27 DIAGNOSIS — K46.9 UNSPECIFIED ABDOMINAL HERNIA WITHOUT OBSTRUCTION OR GANGRENE: Chronic | ICD-10-CM

## 2019-11-27 DIAGNOSIS — K51.90 ULCERATIVE COLITIS, UNSPECIFIED, WITHOUT COMPLICATIONS: ICD-10-CM

## 2019-11-27 LAB
ALBUMIN SERPL ELPH-MCNC: 4 G/DL — SIGNIFICANT CHANGE UP (ref 3.3–5)
ALP SERPL-CCNC: 66 U/L — SIGNIFICANT CHANGE UP (ref 40–120)
ALT FLD-CCNC: 12 U/L — SIGNIFICANT CHANGE UP (ref 10–45)
ANION GAP SERPL CALC-SCNC: 11 MMOL/L — SIGNIFICANT CHANGE UP (ref 5–17)
AST SERPL-CCNC: 12 U/L — SIGNIFICANT CHANGE UP (ref 10–40)
BILIRUB DIRECT SERPL-MCNC: <0.2 MG/DL — SIGNIFICANT CHANGE UP (ref 0–0.2)
BILIRUB INDIRECT FLD-MCNC: SIGNIFICANT CHANGE UP MG/DL (ref 0.2–1.2)
BILIRUB SERPL-MCNC: <0.2 MG/DL — SIGNIFICANT CHANGE UP (ref 0.2–1.2)
BUN SERPL-MCNC: 9 MG/DL — SIGNIFICANT CHANGE UP (ref 7–23)
CALCIUM SERPL-MCNC: 9.8 MG/DL — SIGNIFICANT CHANGE UP (ref 8.4–10.5)
CHLORIDE SERPL-SCNC: 104 MMOL/L — SIGNIFICANT CHANGE UP (ref 96–108)
CO2 SERPL-SCNC: 25 MMOL/L — SIGNIFICANT CHANGE UP (ref 22–31)
CREAT SERPL-MCNC: 0.67 MG/DL — SIGNIFICANT CHANGE UP (ref 0.5–1.3)
CRP SERPL-MCNC: 4.64 MG/DL — HIGH (ref 0–0.4)
GLUCOSE SERPL-MCNC: 82 MG/DL — SIGNIFICANT CHANGE UP (ref 70–99)
HCT VFR BLD CALC: 37.1 % — SIGNIFICANT CHANGE UP (ref 34.5–45)
HGB BLD-MCNC: 11.8 G/DL — SIGNIFICANT CHANGE UP (ref 11.5–15.5)
MCHC RBC-ENTMCNC: 28.7 PG — SIGNIFICANT CHANGE UP (ref 27–34)
MCHC RBC-ENTMCNC: 31.8 GM/DL — LOW (ref 32–36)
MCV RBC AUTO: 90.3 FL — SIGNIFICANT CHANGE UP (ref 80–100)
NRBC # BLD: 0 /100 WBCS — SIGNIFICANT CHANGE UP (ref 0–0)
PLATELET # BLD AUTO: 410 K/UL — HIGH (ref 150–400)
POTASSIUM SERPL-MCNC: 3.9 MMOL/L — SIGNIFICANT CHANGE UP (ref 3.5–5.3)
POTASSIUM SERPL-SCNC: 3.9 MMOL/L — SIGNIFICANT CHANGE UP (ref 3.5–5.3)
PROT SERPL-MCNC: 7.9 G/DL — SIGNIFICANT CHANGE UP (ref 6–8.3)
RBC # BLD: 4.11 M/UL — SIGNIFICANT CHANGE UP (ref 3.8–5.2)
RBC # FLD: 11.7 % — SIGNIFICANT CHANGE UP (ref 10.3–14.5)
SODIUM SERPL-SCNC: 140 MMOL/L — SIGNIFICANT CHANGE UP (ref 135–145)
WBC # BLD: 11.92 K/UL — HIGH (ref 3.8–10.5)
WBC # FLD AUTO: 11.92 K/UL — HIGH (ref 3.8–10.5)

## 2019-11-27 PROCEDURE — 36415 COLL VENOUS BLD VENIPUNCTURE: CPT

## 2019-11-27 PROCEDURE — 80076 HEPATIC FUNCTION PANEL: CPT

## 2019-11-27 PROCEDURE — 85027 COMPLETE CBC AUTOMATED: CPT

## 2019-11-27 PROCEDURE — 86140 C-REACTIVE PROTEIN: CPT

## 2019-11-27 PROCEDURE — 80048 BASIC METABOLIC PNL TOTAL CA: CPT

## 2019-11-27 PROCEDURE — 80280 DRUG ASSAY VEDOLIZUMAB: CPT

## 2019-11-27 PROCEDURE — 96413 CHEMO IV INFUSION 1 HR: CPT

## 2019-11-27 RX ORDER — LORATADINE 10 MG/1
10 TABLET ORAL ONCE
Refills: 0 | Status: COMPLETED | OUTPATIENT
Start: 2019-11-27 | End: 2019-11-27

## 2019-11-27 RX ORDER — ACETAMINOPHEN 500 MG
650 TABLET ORAL ONCE
Refills: 0 | Status: COMPLETED | OUTPATIENT
Start: 2019-11-27 | End: 2019-11-27

## 2019-11-27 RX ORDER — VEDOLIZUMAB 108 MG/.68ML
300 INJECTION, SOLUTION SUBCUTANEOUS ONCE
Refills: 0 | Status: COMPLETED | OUTPATIENT
Start: 2019-11-27 | End: 2019-11-27

## 2019-11-27 RX ADMIN — Medication 650 MILLIGRAM(S): at 13:54

## 2019-11-27 RX ADMIN — LORATADINE 10 MILLIGRAM(S): 10 TABLET ORAL at 13:54

## 2019-11-27 RX ADMIN — VEDOLIZUMAB 300 MILLIGRAM(S): 108 INJECTION, SOLUTION SUBCUTANEOUS at 14:30

## 2019-11-27 RX ADMIN — VEDOLIZUMAB 500 MILLIGRAM(S): 108 INJECTION, SOLUTION SUBCUTANEOUS at 14:00

## 2019-12-03 LAB
ANTIBODIES TO VEDOLIZUMAB (ATV) CONCENTRATION: < 1.6 U/ML — SIGNIFICANT CHANGE UP
PROMETHEUS ANSER VEDOLIZUMAB RESULT: SIGNIFICANT CHANGE UP
PROMETHEUS LABORATORY FOOTER: SIGNIFICANT CHANGE UP
SERUM VEDOLIZUMAB (VDZ) CONCENTRATION: 13 UG/ML — SIGNIFICANT CHANGE UP

## 2019-12-11 DIAGNOSIS — K51.20 ULCERATIVE (CHRONIC) PROCTITIS WITHOUT COMPLICATIONS: ICD-10-CM

## 2019-12-30 ENCOUNTER — MOBILE ON CALL (OUTPATIENT)
Age: 24
End: 2019-12-30

## 2020-01-16 RX ORDER — MESALAMINE 0.38 G/1
0.38 CAPSULE, EXTENDED RELEASE ORAL
Qty: 360 | Refills: 3 | Status: DISCONTINUED | COMMUNITY
Start: 2019-06-18 | End: 2020-01-16

## 2020-01-22 ENCOUNTER — APPOINTMENT (OUTPATIENT)
Age: 25
End: 2020-01-22

## 2020-02-05 ENCOUNTER — APPOINTMENT (OUTPATIENT)
Age: 25
End: 2020-02-05

## 2020-02-05 ENCOUNTER — OUTPATIENT (OUTPATIENT)
Dept: OUTPATIENT SERVICES | Facility: HOSPITAL | Age: 25
LOS: 1 days | End: 2020-02-05
Payer: COMMERCIAL

## 2020-02-05 VITALS
RESPIRATION RATE: 18 BRPM | HEART RATE: 83 BPM | SYSTOLIC BLOOD PRESSURE: 112 MMHG | DIASTOLIC BLOOD PRESSURE: 74 MMHG | OXYGEN SATURATION: 100 % | TEMPERATURE: 98 F

## 2020-02-05 DIAGNOSIS — K46.9 UNSPECIFIED ABDOMINAL HERNIA WITHOUT OBSTRUCTION OR GANGRENE: Chronic | ICD-10-CM

## 2020-02-05 LAB
ALBUMIN SERPL ELPH-MCNC: 4.4 G/DL — SIGNIFICANT CHANGE UP (ref 3.3–5)
ALP SERPL-CCNC: 63 U/L — SIGNIFICANT CHANGE UP (ref 40–120)
ALT FLD-CCNC: 12 U/L — SIGNIFICANT CHANGE UP (ref 10–45)
ANION GAP SERPL CALC-SCNC: 10 MMOL/L — SIGNIFICANT CHANGE UP (ref 5–17)
AST SERPL-CCNC: 16 U/L — SIGNIFICANT CHANGE UP (ref 10–40)
BILIRUB DIRECT SERPL-MCNC: <0.2 MG/DL — SIGNIFICANT CHANGE UP (ref 0–0.2)
BILIRUB INDIRECT FLD-MCNC: SIGNIFICANT CHANGE UP MG/DL (ref 0.2–1.2)
BILIRUB SERPL-MCNC: 0.2 MG/DL — SIGNIFICANT CHANGE UP (ref 0.2–1.2)
BUN SERPL-MCNC: 9 MG/DL — SIGNIFICANT CHANGE UP (ref 7–23)
CALCIUM SERPL-MCNC: 9.7 MG/DL — SIGNIFICANT CHANGE UP (ref 8.4–10.5)
CHLORIDE SERPL-SCNC: 102 MMOL/L — SIGNIFICANT CHANGE UP (ref 96–108)
CO2 SERPL-SCNC: 24 MMOL/L — SIGNIFICANT CHANGE UP (ref 22–31)
CREAT SERPL-MCNC: 0.64 MG/DL — SIGNIFICANT CHANGE UP (ref 0.5–1.3)
CRP SERPL-MCNC: 0.96 MG/DL — HIGH (ref 0–0.4)
GLUCOSE SERPL-MCNC: 144 MG/DL — HIGH (ref 70–99)
HCT VFR BLD CALC: 37.5 % — SIGNIFICANT CHANGE UP (ref 34.5–45)
HGB BLD-MCNC: 11.8 G/DL — SIGNIFICANT CHANGE UP (ref 11.5–15.5)
MCHC RBC-ENTMCNC: 27.5 PG — SIGNIFICANT CHANGE UP (ref 27–34)
MCHC RBC-ENTMCNC: 31.5 GM/DL — LOW (ref 32–36)
MCV RBC AUTO: 87.4 FL — SIGNIFICANT CHANGE UP (ref 80–100)
NRBC # BLD: 0 /100 WBCS — SIGNIFICANT CHANGE UP (ref 0–0)
PLATELET # BLD AUTO: 310 K/UL — SIGNIFICANT CHANGE UP (ref 150–400)
POTASSIUM SERPL-MCNC: 4.2 MMOL/L — SIGNIFICANT CHANGE UP (ref 3.5–5.3)
POTASSIUM SERPL-SCNC: 4.2 MMOL/L — SIGNIFICANT CHANGE UP (ref 3.5–5.3)
PROT SERPL-MCNC: 7.9 G/DL — SIGNIFICANT CHANGE UP (ref 6–8.3)
RBC # BLD: 4.29 M/UL — SIGNIFICANT CHANGE UP (ref 3.8–5.2)
RBC # FLD: 13.5 % — SIGNIFICANT CHANGE UP (ref 10.3–14.5)
SODIUM SERPL-SCNC: 136 MMOL/L — SIGNIFICANT CHANGE UP (ref 135–145)
WBC # BLD: 10.92 K/UL — HIGH (ref 3.8–10.5)
WBC # FLD AUTO: 10.92 K/UL — HIGH (ref 3.8–10.5)

## 2020-02-05 PROCEDURE — 80048 BASIC METABOLIC PNL TOTAL CA: CPT

## 2020-02-05 PROCEDURE — 86140 C-REACTIVE PROTEIN: CPT

## 2020-02-05 PROCEDURE — 80076 HEPATIC FUNCTION PANEL: CPT

## 2020-02-05 PROCEDURE — 96413 CHEMO IV INFUSION 1 HR: CPT

## 2020-02-05 PROCEDURE — 85027 COMPLETE CBC AUTOMATED: CPT

## 2020-02-05 PROCEDURE — 36415 COLL VENOUS BLD VENIPUNCTURE: CPT

## 2020-02-05 PROCEDURE — 80280 DRUG ASSAY VEDOLIZUMAB: CPT

## 2020-02-05 RX ORDER — LIDOCAINE 4 G/100G
0 CREAM TOPICAL
Qty: 0 | Refills: 0 | DISCHARGE

## 2020-02-05 RX ORDER — MESALAMINE 400 MG
4 TABLET, DELAYED RELEASE (ENTERIC COATED) ORAL
Qty: 0 | Refills: 0 | DISCHARGE

## 2020-02-05 RX ORDER — VEDOLIZUMAB 108 MG/.68ML
300 INJECTION, SOLUTION SUBCUTANEOUS ONCE
Refills: 0 | Status: COMPLETED | OUTPATIENT
Start: 2020-02-05 | End: 2020-02-05

## 2020-02-05 RX ORDER — LORATADINE 10 MG/1
10 TABLET ORAL ONCE
Refills: 0 | Status: COMPLETED | OUTPATIENT
Start: 2020-02-05 | End: 2020-02-05

## 2020-02-05 RX ORDER — ACETAMINOPHEN 500 MG
650 TABLET ORAL ONCE
Refills: 0 | Status: COMPLETED | OUTPATIENT
Start: 2020-02-05 | End: 2020-02-05

## 2020-02-05 RX ORDER — HYDROCORTISONE 1 %
1 OINTMENT (GRAM) TOPICAL
Qty: 0 | Refills: 0 | DISCHARGE

## 2020-02-05 RX ADMIN — Medication 650 MILLIGRAM(S): at 11:08

## 2020-02-05 RX ADMIN — VEDOLIZUMAB 500 MILLIGRAM(S): 108 INJECTION, SOLUTION SUBCUTANEOUS at 11:10

## 2020-02-05 RX ADMIN — LORATADINE 10 MILLIGRAM(S): 10 TABLET ORAL at 11:08

## 2020-02-05 RX ADMIN — VEDOLIZUMAB 300 MILLIGRAM(S): 108 INJECTION, SOLUTION SUBCUTANEOUS at 11:40

## 2020-02-05 RX ADMIN — Medication 650 MILLIGRAM(S): at 11:12

## 2020-02-07 LAB
ANTIBODIES TO VEDOLIZUMAB (ATV) CONCENTRATION: < 1.6 U/ML — SIGNIFICANT CHANGE UP
PROMETHEUS ANSER VEDOLIZUMAB RESULT: SIGNIFICANT CHANGE UP
PROMETHEUS LABORATORY FOOTER: SIGNIFICANT CHANGE UP
SERUM VEDOLIZUMAB (VDZ) CONCENTRATION: 3.8 UG/ML — SIGNIFICANT CHANGE UP

## 2020-02-21 DIAGNOSIS — K51.20 ULCERATIVE (CHRONIC) PROCTITIS WITHOUT COMPLICATIONS: ICD-10-CM

## 2020-04-01 ENCOUNTER — OUTPATIENT (OUTPATIENT)
Dept: OUTPATIENT SERVICES | Facility: HOSPITAL | Age: 25
LOS: 1 days | End: 2020-04-01
Payer: COMMERCIAL

## 2020-04-01 ENCOUNTER — APPOINTMENT (OUTPATIENT)
Age: 25
End: 2020-04-01

## 2020-04-01 VITALS
OXYGEN SATURATION: 98 % | SYSTOLIC BLOOD PRESSURE: 113 MMHG | DIASTOLIC BLOOD PRESSURE: 72 MMHG | TEMPERATURE: 99 F | RESPIRATION RATE: 16 BRPM | HEART RATE: 78 BPM

## 2020-04-01 DIAGNOSIS — K46.9 UNSPECIFIED ABDOMINAL HERNIA WITHOUT OBSTRUCTION OR GANGRENE: Chronic | ICD-10-CM

## 2020-04-01 DIAGNOSIS — K51.90 ULCERATIVE COLITIS, UNSPECIFIED, WITHOUT COMPLICATIONS: ICD-10-CM

## 2020-04-01 LAB
ALBUMIN SERPL ELPH-MCNC: 3.8 G/DL — SIGNIFICANT CHANGE UP (ref 3.3–5)
ALP SERPL-CCNC: 54 U/L — SIGNIFICANT CHANGE UP (ref 40–120)
ALT FLD-CCNC: 11 U/L — SIGNIFICANT CHANGE UP (ref 10–45)
ANION GAP SERPL CALC-SCNC: 10 MMOL/L — SIGNIFICANT CHANGE UP (ref 5–17)
AST SERPL-CCNC: 13 U/L — SIGNIFICANT CHANGE UP (ref 10–40)
BILIRUB DIRECT SERPL-MCNC: <0.2 MG/DL — SIGNIFICANT CHANGE UP (ref 0–0.2)
BILIRUB INDIRECT FLD-MCNC: >0 MG/DL — LOW (ref 0.2–1)
BILIRUB SERPL-MCNC: 0.2 MG/DL — SIGNIFICANT CHANGE UP (ref 0.2–1.2)
BUN SERPL-MCNC: 7 MG/DL — SIGNIFICANT CHANGE UP (ref 7–23)
CALCIUM SERPL-MCNC: 9.5 MG/DL — SIGNIFICANT CHANGE UP (ref 8.4–10.5)
CHLORIDE SERPL-SCNC: 101 MMOL/L — SIGNIFICANT CHANGE UP (ref 96–108)
CO2 SERPL-SCNC: 27 MMOL/L — SIGNIFICANT CHANGE UP (ref 22–31)
CREAT SERPL-MCNC: 0.72 MG/DL — SIGNIFICANT CHANGE UP (ref 0.5–1.3)
CRP SERPL-MCNC: 0.98 MG/DL — HIGH (ref 0–0.4)
GLUCOSE SERPL-MCNC: 96 MG/DL — SIGNIFICANT CHANGE UP (ref 70–99)
HCT VFR BLD CALC: 37.3 % — SIGNIFICANT CHANGE UP (ref 34.5–45)
HGB BLD-MCNC: 12.1 G/DL — SIGNIFICANT CHANGE UP (ref 11.5–15.5)
MCHC RBC-ENTMCNC: 28.5 PG — SIGNIFICANT CHANGE UP (ref 27–34)
MCHC RBC-ENTMCNC: 32.4 GM/DL — SIGNIFICANT CHANGE UP (ref 32–36)
MCV RBC AUTO: 88 FL — SIGNIFICANT CHANGE UP (ref 80–100)
NRBC # BLD: 0 /100 WBCS — SIGNIFICANT CHANGE UP (ref 0–0)
PLATELET # BLD AUTO: 379 K/UL — SIGNIFICANT CHANGE UP (ref 150–400)
POTASSIUM SERPL-MCNC: 3.9 MMOL/L — SIGNIFICANT CHANGE UP (ref 3.5–5.3)
POTASSIUM SERPL-SCNC: 3.9 MMOL/L — SIGNIFICANT CHANGE UP (ref 3.5–5.3)
PROT SERPL-MCNC: 7.4 G/DL — SIGNIFICANT CHANGE UP (ref 6–8.3)
RBC # BLD: 4.24 M/UL — SIGNIFICANT CHANGE UP (ref 3.8–5.2)
RBC # FLD: 14.6 % — HIGH (ref 10.3–14.5)
SODIUM SERPL-SCNC: 138 MMOL/L — SIGNIFICANT CHANGE UP (ref 135–145)
WBC # BLD: 11.36 K/UL — HIGH (ref 3.8–10.5)
WBC # FLD AUTO: 11.36 K/UL — HIGH (ref 3.8–10.5)

## 2020-04-01 PROCEDURE — 85027 COMPLETE CBC AUTOMATED: CPT

## 2020-04-01 PROCEDURE — 80076 HEPATIC FUNCTION PANEL: CPT

## 2020-04-01 PROCEDURE — 96365 THER/PROPH/DIAG IV INF INIT: CPT

## 2020-04-01 PROCEDURE — 80280 DRUG ASSAY VEDOLIZUMAB: CPT

## 2020-04-01 PROCEDURE — 36415 COLL VENOUS BLD VENIPUNCTURE: CPT

## 2020-04-01 PROCEDURE — 80048 BASIC METABOLIC PNL TOTAL CA: CPT

## 2020-04-01 PROCEDURE — 86140 C-REACTIVE PROTEIN: CPT

## 2020-04-01 RX ORDER — ACETAMINOPHEN 500 MG
650 TABLET ORAL ONCE
Refills: 0 | Status: COMPLETED | OUTPATIENT
Start: 2020-04-01 | End: 2020-04-01

## 2020-04-01 RX ORDER — VEDOLIZUMAB 108 MG/.68ML
300 INJECTION, SOLUTION SUBCUTANEOUS ONCE
Refills: 0 | Status: COMPLETED | OUTPATIENT
Start: 2020-04-01 | End: 2020-04-01

## 2020-04-01 RX ADMIN — VEDOLIZUMAB 300 MILLIGRAM(S): 108 INJECTION, SOLUTION SUBCUTANEOUS at 11:40

## 2020-04-01 RX ADMIN — VEDOLIZUMAB 500 MILLIGRAM(S): 108 INJECTION, SOLUTION SUBCUTANEOUS at 11:10

## 2020-04-01 RX ADMIN — LORATADINE 10 MILLIGRAM(S): 10 TABLET ORAL at 11:00

## 2020-04-01 RX ADMIN — Medication 650 MILLIGRAM(S): at 11:00

## 2020-04-03 DIAGNOSIS — K51.20 ULCERATIVE (CHRONIC) PROCTITIS WITHOUT COMPLICATIONS: ICD-10-CM

## 2020-04-08 LAB
ANTIBODIES TO VEDOLIZUMAB (ATV) CONCENTRATION: < 1.6 U/ML — SIGNIFICANT CHANGE UP
PROMETHEUS ANSER VEDOLIZUMAB RESULT: SIGNIFICANT CHANGE UP
PROMETHEUS LABORATORY FOOTER: SIGNIFICANT CHANGE UP
SERUM VEDOLIZUMAB (VDZ) CONCENTRATION: 6.1 UG/ML — SIGNIFICANT CHANGE UP

## 2020-05-01 ENCOUNTER — APPOINTMENT (OUTPATIENT)
Dept: GASTROENTEROLOGY | Facility: CLINIC | Age: 25
End: 2020-05-01
Payer: COMMERCIAL

## 2020-05-01 PROCEDURE — 99442: CPT

## 2020-05-01 RX ORDER — BUDESONIDE 3 MG/1
3 CAPSULE, COATED PELLETS ORAL
Qty: 90 | Refills: 0 | Status: DISCONTINUED | COMMUNITY
Start: 2020-04-21 | End: 2020-05-01

## 2020-05-01 RX ORDER — MESALAMINE 1.2 G/1
1.2 TABLET, DELAYED RELEASE ORAL
Qty: 120 | Refills: 4 | Status: DISCONTINUED | COMMUNITY
Start: 2020-01-16 | End: 2020-05-01

## 2020-05-14 ENCOUNTER — RESULT REVIEW (OUTPATIENT)
Age: 25
End: 2020-05-14

## 2020-05-14 ENCOUNTER — APPOINTMENT (OUTPATIENT)
Dept: GASTROENTEROLOGY | Facility: HOSPITAL | Age: 25
End: 2020-05-14

## 2020-05-14 ENCOUNTER — OUTPATIENT (OUTPATIENT)
Dept: OUTPATIENT SERVICES | Facility: HOSPITAL | Age: 25
LOS: 1 days | Discharge: HOME | End: 2020-05-14
Payer: COMMERCIAL

## 2020-05-14 DIAGNOSIS — K46.9 UNSPECIFIED ABDOMINAL HERNIA WITHOUT OBSTRUCTION OR GANGRENE: Chronic | ICD-10-CM

## 2020-05-14 PROCEDURE — 87449 NOS EACH ORGANISM AG IA: CPT

## 2020-05-14 PROCEDURE — 87324 CLOSTRIDIUM AG IA: CPT

## 2020-05-14 PROCEDURE — 88305 TISSUE EXAM BY PATHOLOGIST: CPT

## 2020-05-14 PROCEDURE — 45331 SIGMOIDOSCOPY AND BIOPSY: CPT | Mod: GC

## 2020-05-14 PROCEDURE — 45331 SIGMOIDOSCOPY AND BIOPSY: CPT

## 2020-05-14 PROCEDURE — 88305 TISSUE EXAM BY PATHOLOGIST: CPT | Mod: 26

## 2020-05-15 LAB
C DIFF GDH STL QL: NEGATIVE — SIGNIFICANT CHANGE UP
C DIFF GDH STL QL: SIGNIFICANT CHANGE UP
SURGICAL PATHOLOGY STUDY: SIGNIFICANT CHANGE UP

## 2020-05-28 ENCOUNTER — OUTPATIENT (OUTPATIENT)
Dept: OUTPATIENT SERVICES | Facility: HOSPITAL | Age: 25
LOS: 1 days | End: 2020-05-28
Payer: COMMERCIAL

## 2020-05-28 ENCOUNTER — APPOINTMENT (OUTPATIENT)
Age: 25
End: 2020-05-28

## 2020-05-28 VITALS
HEART RATE: 86 BPM | RESPIRATION RATE: 18 BRPM | OXYGEN SATURATION: 100 % | DIASTOLIC BLOOD PRESSURE: 63 MMHG | TEMPERATURE: 98 F | SYSTOLIC BLOOD PRESSURE: 100 MMHG

## 2020-05-28 VITALS
OXYGEN SATURATION: 100 % | TEMPERATURE: 97 F | DIASTOLIC BLOOD PRESSURE: 68 MMHG | HEART RATE: 90 BPM | SYSTOLIC BLOOD PRESSURE: 104 MMHG | HEIGHT: 62 IN | RESPIRATION RATE: 18 BRPM | WEIGHT: 119.93 LBS

## 2020-05-28 DIAGNOSIS — K51.90 ULCERATIVE COLITIS, UNSPECIFIED, WITHOUT COMPLICATIONS: ICD-10-CM

## 2020-05-28 DIAGNOSIS — K46.9 UNSPECIFIED ABDOMINAL HERNIA WITHOUT OBSTRUCTION OR GANGRENE: Chronic | ICD-10-CM

## 2020-05-28 LAB
ALBUMIN SERPL ELPH-MCNC: 3.6 G/DL — SIGNIFICANT CHANGE UP (ref 3.3–5)
ALP SERPL-CCNC: 44 U/L — SIGNIFICANT CHANGE UP (ref 40–120)
ALT FLD-CCNC: 12 U/L — SIGNIFICANT CHANGE UP (ref 10–45)
ANION GAP SERPL CALC-SCNC: 10 MMOL/L — SIGNIFICANT CHANGE UP (ref 5–17)
AST SERPL-CCNC: 11 U/L — SIGNIFICANT CHANGE UP (ref 10–40)
BILIRUB DIRECT SERPL-MCNC: <0.2 MG/DL — SIGNIFICANT CHANGE UP (ref 0–0.2)
BILIRUB INDIRECT FLD-MCNC: >0 MG/DL — LOW (ref 0.2–1)
BILIRUB SERPL-MCNC: 0.2 MG/DL — SIGNIFICANT CHANGE UP (ref 0.2–1.2)
BUN SERPL-MCNC: 10 MG/DL — SIGNIFICANT CHANGE UP (ref 7–23)
CALCIUM SERPL-MCNC: 9.2 MG/DL — SIGNIFICANT CHANGE UP (ref 8.4–10.5)
CHLORIDE SERPL-SCNC: 99 MMOL/L — SIGNIFICANT CHANGE UP (ref 96–108)
CO2 SERPL-SCNC: 28 MMOL/L — SIGNIFICANT CHANGE UP (ref 22–31)
CREAT SERPL-MCNC: 0.73 MG/DL — SIGNIFICANT CHANGE UP (ref 0.5–1.3)
CRP SERPL-MCNC: 2.34 MG/DL — HIGH (ref 0–0.4)
GLUCOSE SERPL-MCNC: 85 MG/DL — SIGNIFICANT CHANGE UP (ref 70–99)
HCT VFR BLD CALC: 35.2 % — SIGNIFICANT CHANGE UP (ref 34.5–45)
HGB BLD-MCNC: 11.5 G/DL — SIGNIFICANT CHANGE UP (ref 11.5–15.5)
MCHC RBC-ENTMCNC: 30.1 PG — SIGNIFICANT CHANGE UP (ref 27–34)
MCHC RBC-ENTMCNC: 32.7 GM/DL — SIGNIFICANT CHANGE UP (ref 32–36)
MCV RBC AUTO: 92.1 FL — SIGNIFICANT CHANGE UP (ref 80–100)
NRBC # BLD: 0 /100 WBCS — SIGNIFICANT CHANGE UP (ref 0–0)
PLATELET # BLD AUTO: 315 K/UL — SIGNIFICANT CHANGE UP (ref 150–400)
POTASSIUM SERPL-MCNC: 3.8 MMOL/L — SIGNIFICANT CHANGE UP (ref 3.5–5.3)
POTASSIUM SERPL-SCNC: 3.8 MMOL/L — SIGNIFICANT CHANGE UP (ref 3.5–5.3)
PROT SERPL-MCNC: 6.8 G/DL — SIGNIFICANT CHANGE UP (ref 6–8.3)
RBC # BLD: 3.82 M/UL — SIGNIFICANT CHANGE UP (ref 3.8–5.2)
RBC # FLD: 13.6 % — SIGNIFICANT CHANGE UP (ref 10.3–14.5)
SODIUM SERPL-SCNC: 137 MMOL/L — SIGNIFICANT CHANGE UP (ref 135–145)
WBC # BLD: 11.9 K/UL — HIGH (ref 3.8–10.5)
WBC # FLD AUTO: 11.9 K/UL — HIGH (ref 3.8–10.5)

## 2020-05-28 PROCEDURE — 86140 C-REACTIVE PROTEIN: CPT

## 2020-05-28 PROCEDURE — 36415 COLL VENOUS BLD VENIPUNCTURE: CPT

## 2020-05-28 PROCEDURE — 80053 COMPREHEN METABOLIC PANEL: CPT

## 2020-05-28 PROCEDURE — 96413 CHEMO IV INFUSION 1 HR: CPT

## 2020-05-28 PROCEDURE — 85027 COMPLETE CBC AUTOMATED: CPT

## 2020-05-28 PROCEDURE — 82248 BILIRUBIN DIRECT: CPT

## 2020-05-28 RX ORDER — USTEKINUMAB 45 MG/.5ML
260 INJECTION, SOLUTION SUBCUTANEOUS ONCE
Refills: 0 | Status: COMPLETED | OUTPATIENT
Start: 2020-05-28 | End: 2020-05-28

## 2020-05-28 RX ADMIN — USTEKINUMAB 260 MILLIGRAM(S): 45 INJECTION, SOLUTION SUBCUTANEOUS at 10:39

## 2020-05-28 RX ADMIN — USTEKINUMAB 250 MILLIGRAM(S): 45 INJECTION, SOLUTION SUBCUTANEOUS at 09:39

## 2020-05-29 DIAGNOSIS — K50.90 CROHN'S DISEASE, UNSPECIFIED, WITHOUT COMPLICATIONS: ICD-10-CM

## 2020-06-19 RX ORDER — VEDOLIZUMAB 300 MG/5ML
INJECTION, POWDER, LYOPHILIZED, FOR SOLUTION INTRAVENOUS
Refills: 0 | Status: DISCONTINUED | COMMUNITY
End: 2020-06-19

## 2020-06-19 RX ORDER — USTEKINUMAB 130 MG/26ML
SOLUTION INTRAVENOUS
Refills: 0 | Status: ACTIVE | COMMUNITY

## 2020-06-30 DIAGNOSIS — K64.9 UNSPECIFIED HEMORRHOIDS: ICD-10-CM

## 2020-06-30 RX ORDER — HYDROCORTISONE 25 MG/G
2.5 CREAM TOPICAL
Qty: 1 | Refills: 0 | Status: ACTIVE | COMMUNITY
Start: 2020-06-30 | End: 1900-01-01

## 2020-07-07 ENCOUNTER — APPOINTMENT (OUTPATIENT)
Dept: GASTROENTEROLOGY | Facility: CLINIC | Age: 25
End: 2020-07-07
Payer: COMMERCIAL

## 2020-07-07 DIAGNOSIS — K51.90 ULCERATIVE COLITIS, UNSPECIFIED, W/OUT COMPLICATIONS: ICD-10-CM

## 2020-07-07 PROCEDURE — 99442: CPT

## 2020-07-07 RX ORDER — PREDNISONE 10 MG
TABLET ORAL
Refills: 0 | Status: DISCONTINUED | COMMUNITY
End: 2020-07-07

## 2020-07-07 RX ORDER — LIDOCAINE 50 MG/G
5 CREAM TOPICAL
Qty: 1 | Refills: 3 | Status: ACTIVE | COMMUNITY
Start: 2020-07-07 | End: 1900-01-01

## 2020-07-07 RX ORDER — NITROGLYCERIN 4 MG/G
0.4 OINTMENT RECTAL
Qty: 1 | Refills: 3 | Status: ACTIVE | COMMUNITY
Start: 2020-07-07 | End: 1900-01-01

## 2020-07-07 NOTE — PHYSICAL EXAM
[General Appearance - Alert] : alert [General Appearance - In No Acute Distress] : in no acute distress [Mood] : the mood was normal [Affect] : the affect was normal [Oriented To Time, Place, And Person] : oriented to person, place, and time

## 2020-07-07 NOTE — ASSESSMENT
[FreeTextEntry1] : 25 y/o F with extensive UC (Dx 8/2018) complicated by recurrent C diff infection x 3, s/p therapy with vancomycin x 2, fidaxomicin x 1, with resolution of infection (C diff PCR is now negative). No response on 5ASA. Secondary loss of response to IFX and Vedo, now s/p 1 loading dose of UST with 10% improvement, however now c/o sharp rectal pain concerning for anal fissure. \par \par #Rectal pain\par - concern for anal fissure (has had in the past)\par - she is declining Rectiv - had severe headache with syncope with last use therefore will not rx diltiazem either given similar risks\par - continue lidocaine and sitz baths; prevent constipation\par - she asked about percocet however I advised against this given risk of constipation \par - Dr. Gillette information passed on for her to get evaluated and discuss treatment options \par \par # Entensive UC\par - cscope May 2020 on Vedo revealed active inflammation; has switched to Stelara, first maintenance dose due in 2 weeks\par - reports 10% improvement however had viral illness in family last week and has resolving diarrhea now which masks improvement from drug\par - discussed best timing of pregnancy after confirming mucosal healing \par - consider flex sig in September (~ 4 mo after starting therapy) \par \par Cardiomegaly\par - saw cardiologist reports echo WNL; reports he will repeat in 6 months \par \par IBS overlap\par - Can consider desipramine if symptomatic again \par \par #HCM\par - flu shot UTD \par - low titer to Hep B; repeat with PCP \par - immune to MMR \par - immune to varicella \par - B12 level 372 \par - Vit D 41.9 \par - iron panel NML \par - DEXA ordered \par - f/b OB for papsmears and oral birth control \par - cont to avoid tobacco use and NSAID use \par \par F/U 8 weeks, sooner if indicated

## 2020-07-07 NOTE — HISTORY OF PRESENT ILLNESS
[Home] : at home, [unfilled] , at the time of the visit. [Other Location: e.g. Home (Enter Location, City,State)___] : at [unfilled] [Verbal consent obtained from patient] : the patient, [unfilled] [de-identified] : 24 Y F with UC, diagnosed in 8/2018 (extent was proximal TC on initial colonoscopy), complicated by recurrent C diff infection x 3, s/p therapy with vancomycin x 2, fidaxomicin x 1, with resolution of infection (C diff PCR is now negative). nonresponder to both IFX and Vedo; now s/p 1 loading dose of UST with slight improvement masked by recent GI viral illness. Telephone visit conducted for c/o rectal pain for the last two days. \par \par Patient reports she's having severe sharp pain at rectum during and after BMs, difficult to sit. She's also noticing rectal bleeding. Saw her GYN yesterday who reported external hemorrhoids but suspected anal fissure. She denies constipation or straining, having loose BMs since her whole family has viral GI illness last week. No abd pain.  Denies fever, vomiting, melena. Denies infusion reaction from UST. Happy infusion is 30 minutes. Has had anal fissure before that healed on its own, as she had syncopal episode with Rectiv. \par \par No EIMs. \par \par PRIOR HX:\par 23 F with new onset symptoms of bloody diarrhea, diagnosed with UC, sent by primary GI, Dr Martinez for further management. \par Pt says 6 weeks ago, she started having several episodes of bloody diarrhea a day with abd pain and significant weight loss. She went to her pediatrician who diagnosed her with C diff (test positive per pt) and started her on flayl with no improvement. She was referred to Dr Martinez, repeat C diff test (-) and stool studies (-). She underwent flex sig 8/15/18 with showed moderate active colitis from the anal verge to the distal TC (extent of exam) with biopsies consistent with chronic active colitis. She was started on vanco PO and prednisone 40 mg, has been on latter for 2 weeks now with significant improvement and feels 95% better. She now goes 2-5 times/day, scant blood, minimal pain. No fatigue, but unable to sleep d/t pred and had acid reflux. \par \par She has had a "sensitive stomach" for as long as she can remember- foods such as daily, oily or spicy foods give her diarrhea as well as stress and sleep deprivation. \par \par Endoscopy: flex sig 8/15/18 with showed moderate active colitis from the anal verge to the distal TC (extent of exam) \par Pathology: chronic active colitis\par \par SH: , two young children, no smoking/ EtOH\par FH: no h/o IBD\par Allergies: NKDA

## 2020-07-08 ENCOUNTER — APPOINTMENT (OUTPATIENT)
Dept: GASTROENTEROLOGY | Facility: CLINIC | Age: 25
End: 2020-07-08
Payer: COMMERCIAL

## 2020-07-08 PROCEDURE — 99442: CPT

## 2020-07-08 NOTE — PHYSICAL EXAM
[General Appearance - Alert] : alert [Oriented To Time, Place, And Person] : oriented to person, place, and time [General Appearance - In No Acute Distress] : in no acute distress [Affect] : the affect was normal [Mood] : the mood was normal

## 2020-07-09 ENCOUNTER — APPOINTMENT (OUTPATIENT)
Dept: COLORECTAL SURGERY | Facility: CLINIC | Age: 25
End: 2020-07-09
Payer: COMMERCIAL

## 2020-07-09 VITALS
HEART RATE: 93 BPM | WEIGHT: 120 LBS | TEMPERATURE: 98.4 F | BODY MASS INDEX: 21.26 KG/M2 | DIASTOLIC BLOOD PRESSURE: 66 MMHG | SYSTOLIC BLOOD PRESSURE: 109 MMHG | HEIGHT: 63 IN

## 2020-07-09 DIAGNOSIS — K60.2 ANAL FISSURE, UNSPECIFIED: ICD-10-CM

## 2020-07-09 PROCEDURE — 99203 OFFICE O/P NEW LOW 30 MIN: CPT

## 2020-07-09 NOTE — PHYSICAL EXAM
[FreeTextEntry1] : Medical assistant present for duration of physical examination\par \par Gen NAD, AOx3\par Nonlabored breathing\par Ambulating without assistance\par Skin normal color and pigment, no visible lesions or rashes\par \par Anorectal Exam:\par Inspection no erythema, induration or fluctuance, no skin excoriation, posterior midline acute anal fissure, associated edematous tag\par CAMMIE mildly tender, no masses palpated, no blood on gloved finger, elevated tone at rest\par \par Anoscopy attempted but patient could not tolerate due to pain from fissure and internal examination not performed.

## 2020-07-09 NOTE — HISTORY OF PRESENT ILLNESS
[FreeTextEntry1] : 25 y/o F presents for evaluation of anal fissure, referred by Dr. Durham and Nella, NP\par H/o UC, currently on Stelara with one loading dose\par PMH of C Diff x3, treated with  Vancomycin twice and Fidaxomicin once\par H/o anal fissure, treated with Rectiv in the past but prefers not to use 2/2 episode of syncope after use\par Patient and all family member were sick with diarrhea and suspected viral gastroenteritis last week. Denies diarrhea fever, chills, or cough. Not tested for COVID\par Reports severe rectal pain described as a sharp stabbing pain that is worse after the BM's but last all day\par C/o rectal bleeding on the TP and in the bowl with most but not all BM's\par Denies itching\par Advised by GI team to begin sitz baths and prescribed Diltiazem 2% w/lidocaine 5% yesterday\par BH:5-6 times daily, typically loose to watery consistency \par Denies mucous with stools\par Sigmoidoscopy completed 5/14/20 with Dr. Durham\par - marked erythema\par - decreased vascular pattern\par - mild friability, exudates and erosions  extending from the rectum through examined portion of colon consistent with colitis, Rosales II disease activity\par No ASA/NSAIDs last 7 days\par \par \par

## 2020-07-14 NOTE — HISTORY OF PRESENT ILLNESS
[Medical Office: ([facility])___] : at the medical office located in  [Home] : at home, [unfilled] , at the time of the visit. [Verbal consent obtained from patient] : the patient, [unfilled] [de-identified] : 24 Y F with UC, diagnosed in 8/2018 (extent was proximal TC on initial colonoscopy), complicated by recurrent C diff infection x 3, s/p therapy with vancomycin x 2, fidaxomicin x 1, with resolution of infection (C diff PCR is now negative). nonresponder to both IFX and Vedo; now s/p 1 loading dose of UST with slight improvement masked by recent GI viral illness. Telephone visit conducted for c/o rectal pain for the last three days, now worsening and she calls to request expedited appt with colorectal surgeon. \par \par Patient reports she's having severe sharp pain at rectum during and after BMs, difficult to sit. She's also noticing rectal bleeding. Saw her GYN Monday who reported external hemorrhoids but suspected anal fissure. She denies constipation or straining, having loose BMs since her whole family has viral GI illness last week. No abd pain.  Denies fever, vomiting, melena. Denies infusion reaction from UST. Has had anal fissure before that healed on its own, and she had syncopal episode with Rectiv. \par \par No EIMs. \par \par PRIOR HX:\par 23 F with new onset symptoms of bloody diarrhea, diagnosed with UC, sent by primary GI, Dr Martinez for further management. \par Pt says 6 weeks ago, she started having several episodes of bloody diarrhea a day with abd pain and significant weight loss. She went to her pediatrician who diagnosed her with C diff (test positive per pt) and started her on flayl with no improvement. She was referred to Dr Martinez, repeat C diff test (-) and stool studies (-). She underwent flex sig 8/15/18 with showed moderate active colitis from the anal verge to the distal TC (extent of exam) with biopsies consistent with chronic active colitis. She was started on vanco PO and prednisone 40 mg, has been on latter for 2 weeks now with significant improvement and feels 95% better. She now goes 2-5 times/day, scant blood, minimal pain. No fatigue, but unable to sleep d/t pred and had acid reflux. \par \par She has had a "sensitive stomach" for as long as she can remember- foods such as daily, oily or spicy foods give her diarrhea as well as stress and sleep deprivation. \par \par Endoscopy: flex sig 8/15/18 with showed moderate active colitis from the anal verge to the distal TC (extent of exam) \par Pathology: chronic active colitis\par \par SH: , two young children, no smoking/ EtOH\par FH: no h/o IBD\par Allergies: NKDA

## 2020-07-14 NOTE — ASSESSMENT
[FreeTextEntry1] : 25 y/o F with extensive UC (Dx 8/2018) complicated by recurrent C diff infection x 3, s/p therapy with vancomycin x 2, fidaxomicin x 1, with resolution of infection (C diff PCR is now negative). No response on 5ASA. Secondary loss of response to IFX and Vedo, now s/p 1 loading dose of UST with 10% improvement, however now c/o sharp rectal pain concerning for anal fissure. Pain worsening and she called today to expedite appointment with Colorectal. \par \par #Rectal pain\par - concern for anal fissure (has had in the past)\par - she is declining Rectiv - had severe headache with syncope with last use; will rx diltiazem + lidocaine\par - continue sitz baths; prevent constipation\par - she asked about percocet however I advised against this given risk of constipation and more serious ADRs \par - set up with appt with Dr. Gillette for tomorrow AM \par \par # Entensive UC\par - cscope May 2020 on Vedo revealed active inflammation; has switched to Stelara, first maintenance dose due in 2 weeks\par - reports 10% improvement however had viral illness in family last week and has resolving diarrhea now which masks improvement from drug\par - discussed best timing of pregnancy after confirming mucosal healing \par - consider flex sig in September (~ 4 mo after starting therapy) \par \par Cardiomegaly\par - saw cardiologist reports echo WNL; reports he will repeat in 6 months \par \par IBS overlap\par - Can consider desipramine if symptomatic again \par \par #HCM\par - flu shot UTD \par - low titer to Hep B; repeat with PCP \par - immune to MMR \par - immune to varicella \par - B12 level 372 \par - Vit D 41.9 \par - iron panel NML \par - DEXA ordered \par - f/b OB for papsmears and oral birth control \par - cont to avoid tobacco use and NSAID use \par \par F/U 8 weeks, sooner if indicated \par \par Nella Melton NP

## 2020-07-14 NOTE — CONSULT LETTER
[Dear  ___] : Dear  [unfilled], [Courtesy Letter:] : I had the pleasure of seeing your patient, [unfilled], in my office today. [Please see my note below.] : Please see my note below. [Sincerely,] : Sincerely, [FreeTextEntry3] : Ming Durham MD\par Associate Professor of Medicine\par Director IBD Program\par [facility]\par

## 2020-11-24 RX ORDER — USTEKINUMAB 90 MG/ML
90 INJECTION, SOLUTION SUBCUTANEOUS
Qty: 1 | Refills: 2 | Status: ACTIVE | COMMUNITY
Start: 2020-06-19 | End: 1900-01-01

## 2021-02-04 ENCOUNTER — OUTPATIENT (OUTPATIENT)
Dept: OUTPATIENT SERVICES | Facility: HOSPITAL | Age: 26
LOS: 1 days | Discharge: HOME | End: 2021-02-04
Payer: COMMERCIAL

## 2021-02-04 ENCOUNTER — APPOINTMENT (OUTPATIENT)
Dept: GASTROENTEROLOGY | Facility: HOSPITAL | Age: 26
End: 2021-02-04

## 2021-02-04 ENCOUNTER — RESULT REVIEW (OUTPATIENT)
Age: 26
End: 2021-02-04

## 2021-02-04 DIAGNOSIS — K46.9 UNSPECIFIED ABDOMINAL HERNIA WITHOUT OBSTRUCTION OR GANGRENE: Chronic | ICD-10-CM

## 2021-02-04 PROCEDURE — 88305 TISSUE EXAM BY PATHOLOGIST: CPT | Mod: 26

## 2021-02-04 PROCEDURE — 45380 COLONOSCOPY AND BIOPSY: CPT

## 2021-02-04 PROCEDURE — 45380 COLONOSCOPY AND BIOPSY: CPT | Mod: GC

## 2021-02-04 PROCEDURE — 88305 TISSUE EXAM BY PATHOLOGIST: CPT

## 2021-02-05 LAB — SURGICAL PATHOLOGY STUDY: SIGNIFICANT CHANGE UP

## 2021-02-08 RX ORDER — MESALAMINE 1000 MG/1
1000 SUPPOSITORY RECTAL
Qty: 90 | Refills: 1 | Status: ACTIVE | COMMUNITY
Start: 2021-02-04 | End: 1900-01-01

## 2021-02-19 ENCOUNTER — APPOINTMENT (OUTPATIENT)
Dept: GASTROENTEROLOGY | Facility: CLINIC | Age: 26
End: 2021-02-19
Payer: COMMERCIAL

## 2021-02-19 PROCEDURE — 99442: CPT

## 2021-02-19 RX ORDER — MESALAMINE 375 MG/1
0.38 CAPSULE, EXTENDED RELEASE ORAL DAILY
Qty: 120 | Refills: 3 | Status: DISCONTINUED | COMMUNITY
Start: 2020-05-01 | End: 2021-02-19

## 2021-02-19 NOTE — HISTORY OF PRESENT ILLNESS
[Home] : at home, [unfilled] , at the time of the visit. [Medical Office: ([facility])___] : at the medical office located in  [Verbal consent obtained from patient] : the patient, [unfilled] [de-identified] : 25 Y F with UC, diagnosed in 8/2018 (extent was proximal TC on initial colonoscopy), complicated by recurrent C diff infection x 3, s/p therapy with vancomycin x 2, fidaxomicin x 1, with resolution of infection (C diff PCR is now negative). nonresponder to both IFX and Vedo; now on maintenance Stelara in clinical remission, with near endoscopic remission, just mild inflammation in rectum.\par \par Patient reports she feels well. Occasional diarrhea, otherwise no complaints. Using Canasa since cscope with improved loose stools, some scant blood on occasion. + flatus. No abd pain. No urgency. Weight and appetite great. \par \par No EIMs. \par \par PRIOR HX:\par 23 F with new onset symptoms of bloody diarrhea, diagnosed with UC, sent by primary GI, Dr Martinez for further management. \par Pt says 6 weeks ago, she started having several episodes of bloody diarrhea a day with abd pain and significant weight loss. She went to her pediatrician who diagnosed her with C diff (test positive per pt) and started her on flayl with no improvement. She was referred to Dr Martinez, repeat C diff test (-) and stool studies (-). She underwent flex sig 8/15/18 with showed moderate active colitis from the anal verge to the distal TC (extent of exam) with biopsies consistent with chronic active colitis. She was started on vanco PO and prednisone 40 mg, has been on latter for 2 weeks now with significant improvement and feels 95% better. She now goes 2-5 times/day, scant blood, minimal pain. No fatigue, but unable to sleep d/t pred and had acid reflux. \par \par She has had a "sensitive stomach" for as long as she can remember- foods such as daily, oily or spicy foods give her diarrhea as well as stress and sleep deprivation. \par \par Endoscopy: flex sig 8/15/18 with showed moderate active colitis from the anal verge to the distal TC (extent of exam) \par Pathology: chronic active colitis\par \par SH: , two young children, no smoking/ EtOH\par FH: no h/o IBD\par Allergies: NKDA

## 2021-02-19 NOTE — ASSESSMENT
[FreeTextEntry1] : 24 y/o F with extensive UC (Dx 8/2018) complicated by recurrent C diff infection x 3, s/p therapy with vancomycin x 2, fidaxomicin x 1, with resolution of infection (C diff PCR is now negative). No response on 5ASA. Secondary loss of response to IFX and Vedo, now on maintenance UST in clinical remission, and near endoscopic remission. Telephone visit today to discuss cscope results and plan. \par \par # Entensive UC\par - cscope showed mild rectal inflammation, pt has started Canasa with improvement however some scant bleeding and flatus\par - cont Canasa for 1 week nightly, then can attempt every other night if symptoms remain stable\par - labs in 1 month to ensure CRP normalizes \par \par Cardiomegaly\par - saw cardiologist reports echo WNL; reports he will repeat in 6 months \par \par IBS overlap\par - Can consider desipramine if symptomatic again however now feeling great \par \par #HCM\par - flu shot UTD \par - low titer to Hep B; repeat with PCP \par - immune to MMR \par - immune to varicella \par - B12 level 372 \par - Vit D 41.9 \par - iron panel NML \par - DEXA ordered \par - f/b OB for papsmears and oral birth control \par - cont to avoid tobacco use and NSAID use \par \par F/U after lab work completed, sooner if indicated

## 2021-02-19 NOTE — CONSULT LETTER
[Dear  ___] : Dear  [unfilled], [Courtesy Letter:] : I had the pleasure of seeing your patient, [unfilled], in my office today. [Please see my note below.] : Please see my note below. [Sincerely,] : Sincerely, [FreeTextEntry3] : Ming Durham MD\par Associate Professor of Medicine\par Director IBD Program\par [facility]\par

## 2021-07-19 ENCOUNTER — NON-APPOINTMENT (OUTPATIENT)
Age: 26
End: 2021-07-19

## 2022-01-12 ENCOUNTER — APPOINTMENT (OUTPATIENT)
Dept: GASTROENTEROLOGY | Facility: CLINIC | Age: 27
End: 2022-01-12
Payer: COMMERCIAL

## 2022-01-12 PROCEDURE — 99443: CPT

## 2022-01-13 NOTE — REASON FOR VISIT
[Home] : at home, [unfilled] , at the time of the visit. [Medical Office: ([facility])___] : at the medical office located in  [Verbal consent obtained from patient] : the patient, [unfilled] [Follow-Up: _____] : a [unfilled] follow-up visit

## 2022-01-16 NOTE — HISTORY OF PRESENT ILLNESS
[Heartburn] : denies heartburn [Nausea] : denies nausea [Vomiting] : denies vomiting [Diarrhea] : denies diarrhea [Constipation] : denies constipation [Yellow Skin Or Eyes (Jaundice)] : denies jaundice [Abdominal Pain] : denies abdominal pain [Abdominal Swelling] : denies abdominal swelling [Inflammatory Bowel Disease] : inflammatory bowel disease [de-identified] : 27 y/o F with UC, diagnosed in 8/2018 (extent was proximal TC on initial colonoscopy), complicated by recurrent C diff infection x 3, s/p therapy with vancomycin x 2, fidaxomicin x 1, with resolution of infection (C diff PCR is now negative). nonresponder to both IFX and Vedo; started on UST and achieved clinical remission, with near endoscopic remission, just mild inflammation in rectum.\par \par She stopped UST in 2/2021, and started taking Alovera, Vitamin C and dietary modifications. She wanted to stay off the treatment. \par \par Currently she is taking Alovera, Probiotic, Florastor and Folic acid. Denies any complains, feeling she is very positive and very good. Reports 1-3 BM/day with no blood or mucous. Denies any abdominal pain, nausea, vomiting, diarrhea, or constipation. \par \par Reports she is 18 weeks pregnant currently, and every thing stable. \par \par Noted to have elevated transaminases, recommended to follow up, reports today that she had them repeated by her local PCP and were normal. \par \par Previous visit 2/2021\par Patient reports she feels well. Occasional diarrhea, otherwise no complaints. Using Canasa since cscope with improved loose stools, some scant blood on occasion. + flatus. No abd pain. No urgency. Weight and appetite great. \par \par No EIMs. \par \par PRIOR HX:\par 23 F with new onset symptoms of bloody diarrhea, diagnosed with UC, sent by primary GI, Dr Martinez for further management. \par Pt says 6 weeks ago, she started having several episodes of bloody diarrhea a day with abd pain and significant weight loss. She went to her pediatrician who diagnosed her with C diff (test positive per pt) and started her on flayl with no improvement. She was referred to Dr Martinez, repeat C diff test (-) and stool studies (-). She underwent flex sig 8/15/18 with showed moderate active colitis from the anal verge to the distal TC (extent of exam) with biopsies consistent with chronic active colitis. She was started on vanco PO and prednisone 40 mg, has been on latter for 2 weeks now with significant improvement and feels 95% better. She now goes 2-5 times/day, scant blood, minimal pain. No fatigue, but unable to sleep d/t pred and had acid reflux. \par \par She has had a "sensitive stomach" for as long as she can remember- foods such as daily, oily or spicy foods give her diarrhea as well as stress and sleep deprivation. \par \par Endoscopy: flex sig 8/15/18 with showed moderate active colitis from the anal verge to the distal TC (extent of exam) \par Pathology: chronic active colitis\par \par SH: , two young children, no smoking/ EtOH\par FH: no h/o IBD\par Allergies: NKDA.

## 2022-01-16 NOTE — ASSESSMENT
[FreeTextEntry1] : 22 y/o F with extensive UC (Dx 8/2018) complicated by recurrent C diff infection x 3, s/p therapy with vancomycin x 2, fidaxomicin x 1, with resolution of infection (C diff PCR is now negative). No response on 5ASA. Secondary loss of response to IFX and Vedo, started on UST and achieved clinical remission, near endoscopic remission. Stopped UST 2/2021 and currently taking Alovera and Probiotics.  Of note this is her third pregnancy and her previous 2 pregnancies were prior to her diagnosis and she delivered slightly smaller babies over 5 pounds.  Given that she goes into this pregnancy in remission we will be curious to see what the final birthweight will be.\par \par # Extensive UC\par In clinical remission, self stopped therapy in 2/2021, also now Pregnant 18 weeks. \par Detailed discussion with patient about UC disease natural course and flares, especially within 30 days postpartum. Patient reports that she is feeling great and will like to continue her current management plan - Alovera and Probiotics. Risk of possible flare and possible treatment options discussed. \par Will send script for routine labs\par \par #Pregnancy , 8wks\par Patient reports that her OB knows about her UC history. Recommended to establish care with a high risk OB. Patient was found to have GBS in urine.  Given her prior history of C. difficile we would concur with the idea of giving her vancomycin for 3 days overlapping with her treatment for GBS.  She has not yet identified her OB and will discuss this with her OB and we will share our information when she gives us information about her OB team. \par \par #Elevated transaminases \par Patient reports that she got them checked locally and were wnl \par \par Cardiomegaly\par - saw cardiologist reports echo WNL; reports he will repeat in 6 months \par \par IBS overlap\par - Can consider desipramine if symptomatic again however now feeling great \par \par #HCM\par - flu shot UTD \par - low titer to Hep B; repeat with PCP \par - immune to MMR \par - immune to varicella \par - B12 level 1048\par - Vit D 20.9, reports taking supplementation \par - iron panel NML \par - DEXA ordered \par - cont to avoid tobacco use and NSAID use \par \par RTC in 3 months \par \par Ortiz Diallo MD\par Advanced IBD Fellow \par

## 2022-01-16 NOTE — CONSULT LETTER
[Dear  ___] : Dear  [unfilled], [Courtesy Letter:] : I had the pleasure of seeing your patient, [unfilled], in my office today. [Please see my note below.] : Please see my note below. [Sincerely,] : Sincerely, [FreeTextEntry3] : Ming Durham MD\par Associate Professor of Medicine\par Chief of GI\par Director IBD Program\par [facility]\par

## 2022-04-05 ENCOUNTER — NON-APPOINTMENT (OUTPATIENT)
Age: 27
End: 2022-04-05

## 2022-04-06 RX ORDER — MELATONIN 10 MG
600-800 TABLET, SUBLINGUAL SUBLINGUAL
Qty: 60 | Refills: 0 | Status: ACTIVE | COMMUNITY
Start: 2022-04-06 | End: 1900-01-01

## 2022-04-13 ENCOUNTER — APPOINTMENT (OUTPATIENT)
Dept: GASTROENTEROLOGY | Facility: CLINIC | Age: 27
End: 2022-04-13
Payer: COMMERCIAL

## 2022-04-13 PROCEDURE — 99442: CPT

## 2022-04-14 RX ORDER — PREDNISONE 50 MG/1
50 TABLET ORAL
Qty: 100 | Refills: 1 | Status: ACTIVE | COMMUNITY
Start: 2022-04-05 | End: 1900-01-01

## 2022-04-14 NOTE — REASON FOR VISIT
[Follow-Up: _____] : a [unfilled] follow-up visit [Home] : at home, [unfilled] , at the time of the visit. [Medical Office: ([facility])___] : at the medical office located in  [Verbal consent obtained from patient] : the patient, [unfilled]

## 2022-04-14 NOTE — CONSULT LETTER
[Dear  ___] : Dear  [unfilled], [Courtesy Letter:] : I had the pleasure of seeing your patient, [unfilled], in my office today. [Please see my note below.] : Please see my note below. [Sincerely,] : Sincerely, [FreeTextEntry3] : Ming Durham MD\par Associate Professor of Medicine\par Chief of GI\par Director IBD Program\par [facility]\par

## 2022-04-14 NOTE — HISTORY OF PRESENT ILLNESS
[Heartburn] : denies heartburn [Nausea] : denies nausea [Vomiting] : denies vomiting [Diarrhea] : denies diarrhea [Constipation] : denies constipation [Yellow Skin Or Eyes (Jaundice)] : denies jaundice [Abdominal Pain] : denies abdominal pain [Abdominal Swelling] : denies abdominal swelling [Inflammatory Bowel Disease] : inflammatory bowel disease [de-identified] : 25 y/o F with UC, diagnosed in 8/2018 (extent was proximal TC on initial colonoscopy), complicated by recurrent C diff infection x 3, s/p therapy with vancomycin x 2, fidaxomicin x 1, with resolution of infection (C diff PCR is now negative). nonresponder to both IFX and Vedo; started on UST and achieved clinical remission, with near endoscopic remission, just mild inflammation in rectum.\par \par She stopped UST in 2/2021, and started taking Alovera, Vitamin C and dietary modifications. She wanted to stay off the treatment. \par \par Reports she is 19 weeks pregnant currently and had been flaring and it started prednisone.  I had suggested that she see high risk OB and she did take that advice and her dose of prednisone was increased to 40.  She currently feels better with resolution of bleeding.  She is planning to get a second opinion from an IBD physician at [facility].\par \par \par PRIOR HX:\par 23 F with new onset symptoms of bloody diarrhea, diagnosed with UC, sent by primary GI, Dr Martinez for further management. \par Pt says 6 weeks ago, she started having several episodes of bloody diarrhea a day with abd pain and significant weight loss. She went to her pediatrician who diagnosed her with C diff (test positive per pt) and started her on flayl with no improvement. She was referred to Dr Martinez, repeat C diff test (-) and stool studies (-). She underwent flex sig 8/15/18 with showed moderate active colitis from the anal verge to the distal TC (extent of exam) with biopsies consistent with chronic active colitis. She was started on vanco PO and prednisone 40 mg, has been on latter for 2 weeks now with significant improvement and feels 95% better. She now goes 2-5 times/day, scant blood, minimal pain. No fatigue, but unable to sleep d/t pred and had acid reflux. \par \par She has had a "sensitive stomach" for as long as she can remember- foods such as daily, oily or spicy foods give her diarrhea as well as stress and sleep deprivation. \par \par Endoscopy: flex sig 8/15/18 with showed moderate active colitis from the anal verge to the distal TC (extent of exam) \par Pathology: chronic active colitis\par \par SH: , two young children, no smoking/ EtOH\par FH: no h/o IBD\par Allergies: NKDA. [FreeTextEntry1] : \par \par previously patient had choosen to stay off any  therapy, \par She contacted the office last week complaining of increased bowel moevements, watery in consisitentcy. \par Concerned that she is having a flare, patient was advised to do stool tests to r/o any infection and then start on steroids. Reluctant to start steroirds.\par \par Called today for folllow up, repoerts athat she has started taking steroids an d BM have decreased but they are still watery. \par \par She is currently at another doctors appointment and did not complete the clinic visit.

## 2022-04-14 NOTE — ASSESSMENT
[FreeTextEntry1] : 24 y/o F with extensive UC (Dx 8/2018) complicated by recurrent C diff infection x 3, s/p therapy with vancomycin x 2, fidaxomicin x 1, with resolution of infection (C diff PCR is now negative). No response on 5ASA. Secondary loss of response to IFX and Vedo, started on UST and achieved clinical remission, near endoscopic remission. Stopped UST 2/2021 and currently taking Alovera and Probiotics.  Of note this is her third pregnancy and her previous 2 pregnancies were prior to her diagnosis and she delivered slightly smaller babies over 5 pounds.  She is experiencing a moderate to severe flare at week ~30 of pregnancy and she has sought out a high risk OB at my suggestion and will also be seeing a second opinion GI at [facility].\par \par # Extensive UC\par In flare and on steroids and have described a steroid taper to her starting at 40 mg and that supported by her OB.  She will also be getting a second opinion and will reach back out to us after her delivery to continue her long-term care.  In the interim she will be managed by the GI physician at [facility] as she prefers to have coordination between the high risk OB and the GI.\par \par #Pregnancy , ~30wks\par She is establishing care with follow-up there for the remainder of her pregnancy.  She is aware were available to her at any time in follow-up.. \par \par #Elevated transaminases \par Patient reports that she got them checked locally and were wnl \par \par \par #HCM\par - flu shot UTD \par - low titer to Hep B; repeat with PCP \par - immune to MMR \par - immune to varicella \par - B12 level 1048\par - Vit D 20.9, reports taking supplementation \par - iron panel NML \par - DEXA ordered \par - cont to avoid tobacco use and NSAID use \par \par RTC in 3 months \par \par Ortiz Diallo MD\par Advanced IBD Fellow \par

## 2024-05-11 NOTE — DIETITIAN INITIAL EVALUATION ADULT. - PROBLEM SELECTOR PLAN 1
Pt is a 16 year old female who uses the pronouns \"she/her\", who presents after endorsing increasing SI.     Current Diagnosis:   v    Pt's current leisure and recreational interests include:  Social group activities: talking  Physical activities: exercising, running, hitting/punching  Creativity activities:  bracelets  Outdoor activities:  none  Video/board/card games:  kings in the corner and speed  Extra-curricular/volunteer activities:  none  Music related activities:  listening to music  Community based activities: none    3 words that best describe you?  \"Reckless, loud, persistent\".  What do you consider your strengths? \"I always find a way, I am determined\".  What do you consider your weakness? \"Caring about people\" .    Leisure Lifestyle- Ranking each one Yes, No, Not Sure  Leisure & recreation is important to me. \"no\".  I am satisfied with my current leisure lifestyle. \"no\". If not, what would you change, \"go out more\".  I enjoy trying new things, \"yes\"  I prefer to do things by myself rather in a social setting, \"no\".  I consider myself a confident person \"yes\".  There are people I can do activities with when I like, \"no\".     Goals to work on during treatment Recreation Therapy to provide therapeutic group sessions and activities which support pt in increasing impulse control              -Worsening of symptoms (abdominal cramping and diarrhea) consistent with prior flares.  -Afebrile with normal WBC count. Holding abx.  -C/w mesalamine for now  -20 IV solumedrol once C diff returns negative  -anticipate starting biologic. F/u quantiferon and HBV serologies.  -zofran, pepcid   -tylenol for pain  -IVF  -r/o c diff

## 2024-06-19 NOTE — PATIENT PROFILE ADULT - AD AGENT PHONE NUMBER
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 Saw Dr. Bella in Feb 2023 and recommended BAHA. Tried hearing aids at time of hearing loss and didnt help. Currently, having a lot of difficulties with work. intermittently has sharp otalgia AS.  Had vertigo at time of head trauma and reoccurred in the last couple of years - now happens a couple of times a week with quick positional changes and lasts only a few minutes. When exposed to loud noises, increased in tinnitus. No otorrhea, ear infections, or headaches related to hearing. No hx of exposure to loud noises. No family history of hearing loss. Was tried on dyazide - no benefit - had MRI at time - MRI normal - no MRI since -  [phone]

## 2024-07-02 ENCOUNTER — TRANSCRIPTION ENCOUNTER (OUTPATIENT)
Age: 29
End: 2024-07-02

## 2024-07-02 ENCOUNTER — APPOINTMENT (OUTPATIENT)
Dept: ULTRASOUND IMAGING | Facility: CLINIC | Age: 29
End: 2024-07-02
Payer: COMMERCIAL

## 2024-07-02 PROCEDURE — 76641 ULTRASOUND BREAST COMPLETE: CPT | Mod: 50

## 2025-03-21 NOTE — ASSESSMENT
[FreeTextEntry1] : Exam findings and diagnosis were discussed at length with patient. \par Recommendations including increased fiber intake, adequate daily hydration, and sitz baths as needed and after bowel movements were discussed.\par Medical management, such diltiazem cream TID, was discussed.\par Continue medical management of UC per GI.\par All questions answered, patient expressed understanding and is agreeable to this plan.\par 
Detail Level: Zone
Render In Strict Bullet Format?: No
Continue Regimen: Tretinoin 0.05% cream apply to face nightly followed by niacinamide (sending to local pharmacy)